# Patient Record
Sex: MALE | Race: BLACK OR AFRICAN AMERICAN | NOT HISPANIC OR LATINO | Employment: OTHER | URBAN - METROPOLITAN AREA
[De-identification: names, ages, dates, MRNs, and addresses within clinical notes are randomized per-mention and may not be internally consistent; named-entity substitution may affect disease eponyms.]

---

## 2017-06-14 ENCOUNTER — HOSPITAL ENCOUNTER (EMERGENCY)
Facility: HOSPITAL | Age: 62
Discharge: HOME | End: 2017-06-14
Attending: EMERGENCY MEDICINE
Payer: MEDICARE

## 2017-06-14 VITALS
OXYGEN SATURATION: 96 % | DIASTOLIC BLOOD PRESSURE: 79 MMHG | SYSTOLIC BLOOD PRESSURE: 139 MMHG | HEART RATE: 96 BPM | RESPIRATION RATE: 18 BRPM

## 2017-06-14 DIAGNOSIS — S46.312A TRICEPS STRAIN, LEFT, INITIAL ENCOUNTER: Primary | ICD-10-CM

## 2017-06-14 DIAGNOSIS — M79.602 ARM PAIN, POSTERIOR, LEFT: ICD-10-CM

## 2017-06-14 PROCEDURE — 99284 EMERGENCY DEPT VISIT MOD MDM: CPT

## 2017-06-14 PROCEDURE — 25000003 PHARM REV CODE 250: Performed by: EMERGENCY MEDICINE

## 2017-06-14 RX ORDER — ACETAMINOPHEN 325 MG/1
325 TABLET ORAL EVERY 6 HOURS PRN
Refills: 0 | COMMUNITY
Start: 2017-06-14 | End: 2017-06-19

## 2017-06-14 RX ORDER — HYDROCODONE BITARTRATE AND ACETAMINOPHEN 10; 325 MG/1; MG/1
1 TABLET ORAL
Status: COMPLETED | OUTPATIENT
Start: 2017-06-14 | End: 2017-06-14

## 2017-06-14 RX ORDER — TRAMADOL HYDROCHLORIDE 50 MG/1
50 TABLET ORAL EVERY 6 HOURS PRN
Qty: 12 TABLET | Refills: 0 | Status: SHIPPED | OUTPATIENT
Start: 2017-06-14 | End: 2017-06-24

## 2017-06-14 RX ADMIN — HYDROCODONE BITARTRATE AND ACETAMINOPHEN 1 TABLET: 10; 325 TABLET ORAL at 10:06

## 2017-06-15 ENCOUNTER — LAB VISIT (OUTPATIENT)
Dept: LAB | Facility: HOSPITAL | Age: 62
End: 2017-06-15
Attending: NURSE PRACTITIONER
Payer: MEDICARE

## 2017-06-15 ENCOUNTER — OFFICE VISIT (OUTPATIENT)
Dept: INTERNAL MEDICINE | Facility: CLINIC | Age: 62
End: 2017-06-15
Payer: MEDICARE

## 2017-06-15 VITALS
TEMPERATURE: 96 F | SYSTOLIC BLOOD PRESSURE: 140 MMHG | DIASTOLIC BLOOD PRESSURE: 88 MMHG | BODY MASS INDEX: 31.48 KG/M2 | HEIGHT: 65 IN | WEIGHT: 188.94 LBS | HEART RATE: 96 BPM

## 2017-06-15 DIAGNOSIS — E55.9 VITAMIN D DEFICIENCY: ICD-10-CM

## 2017-06-15 DIAGNOSIS — E78.5 HYPERLIPIDEMIA, UNSPECIFIED HYPERLIPIDEMIA TYPE: Chronic | ICD-10-CM

## 2017-06-15 DIAGNOSIS — F25.9 SCHIZOAFFECTIVE DISORDER, CHRONIC CONDITION: ICD-10-CM

## 2017-06-15 DIAGNOSIS — E11.49 TYPE 2 DIABETES MELLITUS WITH OTHER NEUROLOGIC COMPLICATION, UNSPECIFIED LONG TERM INSULIN USE STATUS: Chronic | ICD-10-CM

## 2017-06-15 DIAGNOSIS — F31.9 BIPOLAR 1 DISORDER: Chronic | ICD-10-CM

## 2017-06-15 DIAGNOSIS — Z72.0 TOBACCO ABUSE: Chronic | ICD-10-CM

## 2017-06-15 DIAGNOSIS — J45.20 MILD INTERMITTENT ASTHMA WITHOUT COMPLICATION: Chronic | ICD-10-CM

## 2017-06-15 DIAGNOSIS — R53.1 WEAKNESS: ICD-10-CM

## 2017-06-15 DIAGNOSIS — E11.49 TYPE 2 DIABETES MELLITUS WITH OTHER NEUROLOGIC COMPLICATION, UNSPECIFIED LONG TERM INSULIN USE STATUS: Primary | Chronic | ICD-10-CM

## 2017-06-15 DIAGNOSIS — G24.01 TARDIVE DYSKINESIA: ICD-10-CM

## 2017-06-15 DIAGNOSIS — R26.2 DIFFICULTY WALKING: ICD-10-CM

## 2017-06-15 LAB
25(OH)D3+25(OH)D2 SERPL-MCNC: 37 NG/ML
ALBUMIN SERPL BCP-MCNC: 4.3 G/DL
ALP SERPL-CCNC: 110 U/L
ALT SERPL W/O P-5'-P-CCNC: 18 U/L
ANION GAP SERPL CALC-SCNC: 13 MMOL/L
AST SERPL-CCNC: 14 U/L
BASOPHILS # BLD AUTO: 0.01 K/UL
BASOPHILS NFR BLD: 0.1 %
BILIRUB SERPL-MCNC: 0.4 MG/DL
BUN SERPL-MCNC: 16 MG/DL
CALCIUM SERPL-MCNC: 10.4 MG/DL
CHLORIDE SERPL-SCNC: 102 MMOL/L
CHOLEST/HDLC SERPL: 3.7 {RATIO}
CO2 SERPL-SCNC: 26 MMOL/L
CREAT SERPL-MCNC: 1.1 MG/DL
DIFFERENTIAL METHOD: ABNORMAL
EOSINOPHIL # BLD AUTO: 0.2 K/UL
EOSINOPHIL NFR BLD: 2.7 %
ERYTHROCYTE [DISTWIDTH] IN BLOOD BY AUTOMATED COUNT: 14.9 %
EST. GFR  (AFRICAN AMERICAN): >60 ML/MIN/1.73 M^2
EST. GFR  (NON AFRICAN AMERICAN): >60 ML/MIN/1.73 M^2
GLUCOSE SERPL-MCNC: 227 MG/DL
HCT VFR BLD AUTO: 47.7 %
HDL/CHOLESTEROL RATIO: 27.3 %
HDLC SERPL-MCNC: 132 MG/DL
HDLC SERPL-MCNC: 36 MG/DL
HGB BLD-MCNC: 16 G/DL
LDLC SERPL CALC-MCNC: 67.8 MG/DL
LYMPHOCYTES # BLD AUTO: 2.9 K/UL
LYMPHOCYTES NFR BLD: 39.9 %
MCH RBC QN AUTO: 31.6 PG
MCHC RBC AUTO-ENTMCNC: 33.5 %
MCV RBC AUTO: 94 FL
MONOCYTES # BLD AUTO: 0.5 K/UL
MONOCYTES NFR BLD: 6.6 %
NEUTROPHILS # BLD AUTO: 3.7 K/UL
NEUTROPHILS NFR BLD: 50.6 %
NONHDLC SERPL-MCNC: 96 MG/DL
PLATELET # BLD AUTO: 268 K/UL
PMV BLD AUTO: 9.9 FL
POTASSIUM SERPL-SCNC: 4.1 MMOL/L
PROT SERPL-MCNC: 8 G/DL
RBC # BLD AUTO: 5.06 M/UL
SODIUM SERPL-SCNC: 141 MMOL/L
TRIGL SERPL-MCNC: 141 MG/DL
WBC # BLD AUTO: 7.29 K/UL

## 2017-06-15 PROCEDURE — 99205 OFFICE O/P NEW HI 60 MIN: CPT | Mod: S$GLB,,, | Performed by: NURSE PRACTITIONER

## 2017-06-15 PROCEDURE — 85025 COMPLETE CBC W/AUTO DIFF WBC: CPT

## 2017-06-15 PROCEDURE — 80061 LIPID PANEL: CPT

## 2017-06-15 PROCEDURE — 82306 VITAMIN D 25 HYDROXY: CPT

## 2017-06-15 PROCEDURE — 80053 COMPREHEN METABOLIC PANEL: CPT

## 2017-06-15 PROCEDURE — 83036 HEMOGLOBIN GLYCOSYLATED A1C: CPT

## 2017-06-15 PROCEDURE — 99999 PR PBB SHADOW E&M-EST. PATIENT-LVL V: CPT | Mod: PBBFAC,,, | Performed by: NURSE PRACTITIONER

## 2017-06-15 PROCEDURE — 36415 COLL VENOUS BLD VENIPUNCTURE: CPT | Mod: PO

## 2017-06-15 PROCEDURE — 3046F HEMOGLOBIN A1C LEVEL >9.0%: CPT | Mod: S$GLB,,, | Performed by: NURSE PRACTITIONER

## 2017-06-15 RX ORDER — SIMVASTATIN 40 MG/1
40 TABLET, FILM COATED ORAL NIGHTLY
Qty: 30 TABLET | Refills: 0 | Status: SHIPPED | OUTPATIENT
Start: 2017-06-15 | End: 2017-08-08 | Stop reason: SDUPTHER

## 2017-06-15 RX ORDER — GABAPENTIN 600 MG/1
600 TABLET ORAL 2 TIMES DAILY
Qty: 60 TABLET | Refills: 0 | Status: SHIPPED | OUTPATIENT
Start: 2017-06-15 | End: 2017-08-08 | Stop reason: SDUPTHER

## 2017-06-15 RX ORDER — METFORMIN HYDROCHLORIDE 1000 MG/1
1000 TABLET ORAL 2 TIMES DAILY WITH MEALS
Qty: 60 TABLET | Refills: 0 | Status: SHIPPED | OUTPATIENT
Start: 2017-06-15 | End: 2017-08-08 | Stop reason: SDUPTHER

## 2017-06-15 RX ORDER — AMANTADINE HYDROCHLORIDE 100 MG/1
100 CAPSULE, GELATIN COATED ORAL 2 TIMES DAILY
Qty: 60 CAPSULE | Refills: 0 | Status: SHIPPED | OUTPATIENT
Start: 2017-06-15 | End: 2017-08-09 | Stop reason: SDUPTHER

## 2017-06-15 RX ORDER — INSULIN GLARGINE 100 [IU]/ML
18 INJECTION, SOLUTION SUBCUTANEOUS NIGHTLY
Qty: 18 ML | Refills: 4 | Status: SHIPPED | OUTPATIENT
Start: 2017-06-15 | End: 2018-01-19

## 2017-06-15 RX ORDER — OLANZAPINE 7.5 MG/1
7.5 TABLET ORAL NIGHTLY
Qty: 30 TABLET | Refills: 0 | Status: SHIPPED | OUTPATIENT
Start: 2017-06-15 | End: 2017-08-08 | Stop reason: SDUPTHER

## 2017-06-15 RX ORDER — DULOXETIN HYDROCHLORIDE 60 MG/1
60 CAPSULE, DELAYED RELEASE ORAL DAILY
Qty: 30 CAPSULE | Refills: 0 | Status: SHIPPED | OUTPATIENT
Start: 2017-06-15 | End: 2017-08-08 | Stop reason: SDUPTHER

## 2017-06-15 RX ORDER — ERGOCALCIFEROL 1.25 MG/1
50000 CAPSULE ORAL
Qty: 4 CAPSULE | Refills: 3 | Status: SHIPPED | OUTPATIENT
Start: 2017-06-15

## 2017-06-15 NOTE — DISCHARGE INSTRUCTIONS
Regarding muscle strains, I advised patient to: REST the injured area or use it less than usual; apply ICE to decrease swelling and pain and help prevent tissue damage; use COMPRESSION with an elastic bandage to support the area and decrease swelling; ELEVATE injured body part above the level of the heart to help decrease pain and swelling; AVOID using massage or massage to acute injuries as it may slow healing of the area; AVOID drinking alcohol as it may slow healing of the injury; and avoid stretching injured muscles. Advised patient to return to the emergency department or contact primary care provider if: having trouble moving injured area; notice tingling or numbness in or near the injured area; extremity below the bruise gets cold or turns pale; a new lump develops in the injured area; symptoms do not improve with treatment after 4 to 5 days; there is any questions or concerns about the condition or treatment plan.

## 2017-06-15 NOTE — ED PROVIDER NOTES
SCRIBE #1 NOTE: I, Marco Mcadams, am scribing for, and in the presence of, Preet Hernandes Jr., MD. I have scribed the entire note.      History      Chief Complaint   Patient presents with    Arm Pain     left       Review of patient's allergies indicates:   Allergen Reactions    Grape Anaphylaxis    Pcn [penicillins] Anaphylaxis    Saphris [asenapine] Other (See Comments)     TD/EPS        HPI   HPI    6/14/2017, 9:59 PM   History obtained from the patient      History of Present Illness: Ramu Jones is a 61 y.o. male patient with PMHx of DM and peripheral neuropathy who presents to the Emergency Department for L shoulder pain which onset gradually 2 days ago. Symptoms are intermittent and moderate in severity. Pain radiates down LUE. No mitigating or exacerbating factors reported. Associated sxs include paresthesias to LUE. Pt also reports he was recently dx with peripheral neuropathy of upper and lower extremities secondary to DM. Patient denies any fever, chills, CP, SOB, slurred speech, facial asymmetry, weakness, back pain, incontinence, and all other sxs at this time. No prior tx. No further complaints or concerns at this time.       Arrival mode: EMS    PCP: Rashel Doyle MD       Past Medical History:  Past Medical History:   Diagnosis Date    Asthma     Bipolar 1 disorder     Diabetes mellitus     Renal disorder     kidney stones    Schizophrenia        Past Surgical History:  Past Surgical History:   Procedure Laterality Date    HERNIA REPAIR      SHOULDER SURGERY Left          Family History:  Family History   Problem Relation Age of Onset    Schizophrenia Sister     Arthritis Sister     Asthma Sister     Depression Sister     Diabetes Sister     Hyperlipidemia Sister     Hypertension Sister     Mental illness Sister     Schizophrenia Brother     Asthma Brother     Diabetes Brother     Learning disabilities Brother     Mental illness Brother     Asthma Mother      Diabetes Mother     Mental illness Mother     Asthma Father     Mental illness Father        Social History:  Social History     Social History Main Topics    Smoking status: Current Every Day Smoker     Packs/day: 1.00     Years: 30.00     Start date: 1/1/1977    Smokeless tobacco: Never Used    Alcohol use No    Drug use: No    Sexual activity: Yes     Partners: Female       ROS   Review of Systems   Constitutional: Negative for fever.   HENT: Negative for sore throat.    Respiratory: Negative for shortness of breath.    Cardiovascular: Negative for chest pain.   Gastrointestinal: Negative for nausea.   Genitourinary: Negative for dysuria.   Musculoskeletal: Negative for back pain.        (+) LUE pain   Skin: Negative for rash.   Neurological: Negative for facial asymmetry, speech difficulty and weakness.        (+) paresthesias to LUE   Hematological: Does not bruise/bleed easily.   All other systems reviewed and are negative.    Physical Exam      Initial Vitals [06/14/17 2155]   BP Pulse Resp Temp SpO2   (!) 140/94 100 16 -- 96 %      Physical Exam  Nursing Notes and Vital Signs Reviewed.  Constitutional: Patient is in no acute distress. Well-developed and well-nourished.   Head: Atraumatic. Normocephalic.  Eyes: PERRL. EOM intact. Conjunctivae are not pale. No scleral icterus.  ENT: Mucous membranes are moist. Oropharynx is clear and symmetric.    Neck: Supple. Full ROM. No lymphadenopathy.  Cardiovascular: Regular rate. Regular rhythm. No murmurs, rubs, or gallops. Distal pulses are 2+ and symmetric.  Pulmonary/Chest: No respiratory distress. Clear to auscultation bilaterally. No wheezing, rales, or rhonchi.  Abdominal: Soft and non-distended.  There is no tenderness.  No rebound, guarding, or rigidity. Good bowel sounds.  Genitourinary: No CVA tenderness  Musculoskeletal: Moves all extremities. No obvious deformities. No edema. No calf tenderness.  LUE: has no evident deformity. TTP over L trapezius  muscle. Negative for swelling. ROM is normal. FROM of elbow. Limited ROM of shoulder secondary to pain. Cap refill distally is <2 seconds. Radial pulses are equal and 2+ bilaterally. No motor deficit. No distal sensory deficit.  Skin: Warm and dry.  Neurological:  Alert, awake, and appropriate.  Normal speech.  No acute focal neurological deficits are appreciated.  Psychiatric: Normal affect. Good eye contact. Appropriate in content.    ED Course    Procedures  ED Vital Signs:  Vitals:    06/14/17 2155 06/14/17 2331   BP: (!) 140/94 139/79   Pulse: 100 96   Resp: 16 18   SpO2: 96%          Imaging Results:  Imaging Results          X-Ray Humerus 2 View Left (Final result)  Result time 06/14/17 23:08:25    Final result by Blair Thorpe MD (06/14/17 23:08:25)                 Impression:     See above      Electronically signed by: BLAIR THORPE MD  Date:     06/14/17  Time:    23:08              Narrative:    Exam:Left humerus 2 views    History:     Left arm pain.    Findings:     No fracture or dislocation.  Postop changes left shoulder.                                      The Emergency Provider reviewed the vital signs and test results, which are outlined above.    ED Discussion     11:15 PM: Reassessed pt at this time. Pt is AAO x3, in NAD, and VSS. Pt states his condition has improved at this time. Discussed with pt all pertinent ED information and results. Discussed pt dx and plan of tx. Gave pt all f/u and return to the ED instructions. All questions and concerns were addressed at this time. Pt expresses understanding of information and instructions, and is comfortable with plan to discharge. Pt is stable for discharge.    Driving or other activities under influence of medications - Patient and/or family/caretaker was given a prescription for, or instructed to use a medicine that may impair ability to drive, operate machinery, or participate in other potentially dangerous activities.  Patient was instructed not  to participate in these activities while under the influence of these medications.    Regarding STRAINS/CONTUSIONS, I advised patient to: REST the injured area or use it less than usual; apply ICE to decrease swelling and pain and help prevent tissue damage; use COMPRESSION with an elastic bandage to support the area and decrease swelling; ELEVATE injured body part above the level of the heart to help decrease pain and swelling; AVOID using massage or massage to acute injuries as it may slow healing of the area; AVOID drinking alcohol as it may slow healing of the injury; and avoid stretching injured muscles. Advised patient to return to the emergency department or contact primary care provider if: having trouble moving injured area; notice tingling or numbness in or near the injured area; extremity below the bruise gets cold or turns pale; a new lump develops in the injured area; symptoms do not improve with treatment after 4 to 5 days; there is any questions or concerns about the condition or treatment plan.     ED Medication(s):  Medications   hydrocodone-acetaminophen 10-325mg per tablet 1 tablet (1 tablet Oral Given 6/14/17 0843)       Discharge Medication List as of 6/14/2017 11:16 PM      START taking these medications    Details   acetaminophen (TYLENOL) 325 MG tablet Take 1 tablet (325 mg total) by mouth every 6 (six) hours as needed for Pain., Starting Wed 6/14/2017, OTC      tramadol (ULTRAM) 50 mg tablet Take 1 tablet (50 mg total) by mouth every 6 (six) hours as needed for Pain (use if tylenol does not adequately relieve pain)., Starting Wed 6/14/2017, Until Sat 6/24/2017, Print             Follow-up Information     Rashel Doyle MD. Schedule an appointment as soon as possible for a visit in 1 week.    Specialty:  Internal Medicine  Contact information:  [address] [facility]  [address]  [facility] [id number] [phone]             [facility]. Schedule an appointment as soon as possible  for a visit in 1 week.    Why:  or your physician  Contact information:  [address] [facility] [address]  [phone]             [facility] Internal Medicine. Schedule an appointment as soon as possible for a visit in 1 week.    Specialty:  Internal Medicine  Why:  or your physician  Contact information:  [address] [facility] [facility] [address]  [facility] [id number] [phone]  Additional information:  (off [facility]) 1st floor           [facility] - .    Specialty:  Emergency Medicine  Why:  As needed, If symptoms worsen  Contact information:  [address] [facility] [address] [phone]                   Medical Decision Making    Medical Decision Making:   Clinical Tests:   Radiological Study: Ordered and Reviewed           Scribe Attestation:   Scribe #1: I performed the above scribed service and the documentation accurately describes the services I performed. I attest to the accuracy of the note.    Attending:   Physician Attestation Statement for Scribe #1: I, Preet Hernandes Jr., MD, personally performed the services described in this documentation, as scribed by Marco Mcadams, in my presence, and it is both accurate and complete.          Clinical Impression       ICD-10-CM ICD-9-CM   1. Triceps strain, left, initial encounter S46.312A 840.8   2. Arm pain, posterior, left M79.602 729.5       Disposition:   Disposition: Discharged  Condition: Stable         Preet Hernandes Jr., MD  06/16/17 0322

## 2017-06-15 NOTE — PROGRESS NOTES
Subjective:       Patient ID: Ramu Jones is a 61 y.o. male.    Chief Complaint: Extremity Weakness    Pt presents with numbness and tingling to bilateral hands and leg weakness and need to get established with multiple specialists. Pt was living in [place], but he is now living in [place] and would like to get established with all specialists here in [place].       Smokes 1/2 pack of cigarettes a day  No illicit drug use  No ETOH abuse    DM-   Pt reports polyuria, polyphagia, and polydipsia- he reports urinating every 30 minutes  His BG is not well controlled. He is experiencing numbness and tingling to all extremities  Pt reports that he saw an eye doctor last year- he reports that he did not get his glasses because he could not afford them  He states that he tries to be compliant with DM diet- his wife cooks for him  He reports that he was getting Metformin and both insulins from a mail in company- he is not sure which one.    Bipolar/Schizophrenia- pt thinks that he was last seen by psych 2 years ago (he was seeing Iraj Mac NP [facility]). His PCP in [place] (external to [facility]) was filling his psych meds for him. He was dxed with tardive dyskinesia and has been on Symmetrel.     Vit D def- pt has been out of Vitamin D supplements for a while. He is not sure how long.           Review of Systems   Constitutional: Negative for chills, fatigue and fever.   HENT: Negative for congestion, ear pain, postnasal drip, rhinorrhea, sinus pressure, sneezing and sore throat.    Eyes: Positive for visual disturbance (pt was not able to afford prescribed glasses ). Negative for photophobia and pain.   Respiratory: Negative for cough, chest tightness and shortness of breath.    Cardiovascular: Negative for chest pain, palpitations and leg swelling.   Gastrointestinal: Negative for abdominal pain, constipation, diarrhea, nausea and vomiting.   Endocrine: Positive for polydipsia, polyphagia and polyuria.    Genitourinary: Negative for dysuria and frequency.   Musculoskeletal: Positive for gait problem. Negative for arthralgias.   Skin: Negative.    Allergic/Immunologic: Negative.    Neurological: Negative for dizziness, light-headedness and headaches.   Hematological: Negative.    Psychiatric/Behavioral: Negative.  Negative for sleep disturbance.       Objective:      Physical Exam   Constitutional: He is oriented to person, place, and time. He appears well-developed.   HENT:   Head: Normocephalic and atraumatic.   Eyes: Conjunctivae and EOM are normal.   Neck: Normal range of motion.   Cardiovascular: Normal rate, regular rhythm and normal heart sounds.    Pulmonary/Chest: Effort normal and breath sounds normal.   Abdominal: He exhibits distension.   Musculoskeletal:   Walks with a cane. Gait is unsteady   Neurological: He is alert and oriented to person, place, and time.   Skin: Skin is warm and dry.   Psychiatric: He has a normal mood and affect.       Assessment:       1. Type 2 diabetes mellitus with other neurologic complication, unspecified long term insulin use status    2. Difficulty walking    3. Weakness    4. Hyperlipidemia, unspecified hyperlipidemia type    5. Bipolar 1 disorder    6. Schizoaffective disorder, chronic condition    7. Tardive dyskinesia    8. Mild intermittent asthma without complication    9. Vitamin D deficiency    10. Tobacco abuse        Plan:   Type 2 diabetes mellitus with other neurologic complication, unspecified long term insulin use status  -     Ambulatory Referral to Diabetes Education  -     Ambulatory consult to Endocrinology  -     Ambulatory referral to Podiatry  -     CBC auto differential; Future; Expected date: 06/15/2017  -     Comprehensive metabolic panel; Future; Expected date: 06/15/2017  -     HEMOGLOBIN A1C; Future; Expected date: 06/15/2017  -     Microalbumin/creatinine urine ratio; Future; Expected date: 06/15/2017  -     Urinalysis; Future; Expected date:  06/15/2017  -     Lipid panel; Future; Expected date: 06/15/2017  -     gabapentin (NEURONTIN) 600 MG tablet; Take 1 tablet (600 mg total) by mouth 2 (two) times daily.  Dispense: 60 tablet; Refill: 0  -     metformin (GLUCOPHAGE) 1000 MG tablet; Take 1 tablet (1,000 mg total) by mouth 2 (two) times daily with meals.  Dispense: 60 tablet; Refill: 0  -     liraglutide 0.6 mg/0.1 mL, 18 mg/3 mL, subq PNIJ 0.6 mg/0.1 mL (18 mg/3 mL) PnIj; Inject 1.2 mg into the skin once daily.  Dispense: 18 mL; Refill: 3  -     insulin glargine (LANTUS SOLOSTAR) 100 unit/mL (3 mL) InPn pen; Inject 18 Units into the skin every evening.  Dispense: 18 mL; Refill: 4  -     Ambulatory consult to Optometry    Difficulty walking    Weakness    Hyperlipidemia, unspecified hyperlipidemia type  -     Lipid panel; Future; Expected date: 06/15/2017  -     simvastatin (ZOCOR) 40 MG tablet; Take 1 tablet (40 mg total) by mouth every evening.  Dispense: 30 tablet; Refill: 0    Bipolar 1 disorder  -     Ambulatory consult to Psychiatry    Schizoaffective disorder, chronic condition  -     olanzapine (ZYPREXA) 7.5 MG tablet; Take 1 tablet (7.5 mg total) by mouth every evening.  Dispense: 30 tablet; Refill: 0  -     duloxetine (CYMBALTA) 60 MG capsule; Take 1 capsule (60 mg total) by mouth once daily.  Dispense: 30 capsule; Refill: 0    Tardive dyskinesia  -     amantadine HCl (SYMMETREL) 100 mg capsule; Take 1 capsule (100 mg total) by mouth 2 (two) times daily.  Dispense: 60 capsule; Refill: 0    Mild intermittent asthma without complication    Vitamin D deficiency  -     ergocalciferol (ERGOCALCIFEROL) 50,000 unit Cap; Take 1 capsule (50,000 Units total) by mouth every 7 days.  Dispense: 4 capsule; Refill: 3  -     Vitamin D; Future; Expected date: 12/12/2017    Tobacco abuse      As above  To establish with Dr. Farias next week  Multiple specialists referrals as above  Specialists to address further medication refills- one month supply given.   An  excess of 40 mins was spent arranging care for this patient

## 2017-06-16 LAB
ESTIMATED AVG GLUCOSE: 163 MG/DL
HBA1C MFR BLD HPLC: 7.3 %

## 2017-06-19 ENCOUNTER — OFFICE VISIT (OUTPATIENT)
Dept: INTERNAL MEDICINE | Facility: CLINIC | Age: 62
End: 2017-06-19
Payer: MEDICARE

## 2017-06-19 VITALS
DIASTOLIC BLOOD PRESSURE: 80 MMHG | HEART RATE: 99 BPM | WEIGHT: 190.25 LBS | SYSTOLIC BLOOD PRESSURE: 104 MMHG | HEIGHT: 65 IN | BODY MASS INDEX: 31.7 KG/M2 | TEMPERATURE: 97 F

## 2017-06-19 DIAGNOSIS — R25.1 TREMORS OF NERVOUS SYSTEM: ICD-10-CM

## 2017-06-19 DIAGNOSIS — Z79.4 TYPE 2 DIABETES MELLITUS WITH OTHER NEUROLOGIC COMPLICATION, WITH LONG-TERM CURRENT USE OF INSULIN: ICD-10-CM

## 2017-06-19 DIAGNOSIS — F20.9 SCHIZOPHRENIA, UNSPECIFIED TYPE: Primary | ICD-10-CM

## 2017-06-19 DIAGNOSIS — E11.49 TYPE 2 DIABETES MELLITUS WITH OTHER NEUROLOGIC COMPLICATION, WITH LONG-TERM CURRENT USE OF INSULIN: ICD-10-CM

## 2017-06-19 DIAGNOSIS — F31.9 BIPOLAR 1 DISORDER: Chronic | ICD-10-CM

## 2017-06-19 DIAGNOSIS — R53.1 WEAKNESS: ICD-10-CM

## 2017-06-19 DIAGNOSIS — G93.0 ARACHNOID CYST: ICD-10-CM

## 2017-06-19 PROCEDURE — 99999 PR PBB SHADOW E&M-EST. PATIENT-LVL IV: CPT | Mod: PBBFAC,,, | Performed by: FAMILY MEDICINE

## 2017-06-19 PROCEDURE — 3045F PR MOST RECENT HEMOGLOBIN A1C LEVEL 7.0-9.0%: CPT | Mod: S$GLB,,, | Performed by: FAMILY MEDICINE

## 2017-06-19 PROCEDURE — 99214 OFFICE O/P EST MOD 30 MIN: CPT | Mod: S$GLB,,, | Performed by: FAMILY MEDICINE

## 2017-06-19 NOTE — PROGRESS NOTES
Subjective:       Patient ID: Ramu Jones is a 61 y.o. male.    Chief Complaint: Establish Care    DM:       Pt is a Diabetic Type 2 who has an A1C of about 7.3. Pt is on lantus and victoza with metformin. Pt has colonoscopy but was done at [facility]. Pt has a diagnosis of schizophrenia. Pt does see psychiatry.       Tremors:       Unclear if this is neuro based or psychiatric based. Pt does have a archnoid cyst found on MRI. Pt reports that his legs will start tremor. His lower back xray is normal. He denied any tremors in the upper extremity. Pt reports some weakness and falls. Review of chart shows a MRI done which revealed an arachnoid cyst.      Review of Systems   Constitutional: Negative for activity change.   HENT: Negative.    Respiratory: Negative.    Cardiovascular: Negative.    Gastrointestinal: Negative.    Genitourinary: Negative.    Musculoskeletal: Positive for arthralgias.   Neurological: Positive for tremors and weakness. Negative for dizziness, seizures and numbness.   Psychiatric/Behavioral: Negative.        Objective:      Physical Exam   Constitutional: He is oriented to person, place, and time. He appears well-developed and well-nourished.   Neck: Normal range of motion. Neck supple.   Cardiovascular: Normal rate and regular rhythm.    No murmur heard.  Pulmonary/Chest: Effort normal and breath sounds normal. He has no wheezes.   Abdominal: Soft. Bowel sounds are normal.   Neurological: He is alert and oriented to person, place, and time. He has normal reflexes. No cranial nerve deficit.   Skin: Skin is warm and dry.   Psychiatric: His behavior is normal. Judgment normal. His affect is blunt. He is not actively hallucinating. He expresses no homicidal and no suicidal ideation. He expresses no suicidal plans and no homicidal plans.       Assessment:       1. Schizophrenia, unspecified type    2. Bipolar 1 disorder    3. Weakness    4. Tremors of nervous system    5. Type 2 diabetes  mellitus with other neurologic complication, with long-term current use of insulin    6. Arachnoid cyst        Plan:       Schizophrenia, unspecified type  Comments:  Pt will continue to see Psychiatry    Bipolar 1 disorder  Comments:  Pt will continue to see Psychiatry    Weakness  Comments:  Pt is complaining of general weakness and increase falls. would benifit from physical therapy.  Orders:  -     Ambulatory referral to Neurology  -     Ambulatory consult to Physical Therapy    Tremors of nervous system  Comments:  Unclear etiology or if this is even psychiatric based but does have old MRI with arachnoid cyst finding. consult to neurology  Orders:  -     Ambulatory referral to Neurology  -     Ambulatory consult to Physical Therapy    Type 2 diabetes mellitus with other neurologic complication, with long-term current use of insulin  Comments:  Recommend go up 5 units on lantus at last A1C was 7.3 but discussed better diet    Arachnoid cyst  Comments:  Last MRI was done about 2 years ago will refer to Neurology

## 2017-06-21 ENCOUNTER — OFFICE VISIT (OUTPATIENT)
Dept: ENDOCRINOLOGY | Facility: CLINIC | Age: 62
End: 2017-06-21
Payer: MEDICARE

## 2017-06-21 VITALS
WEIGHT: 190.25 LBS | DIASTOLIC BLOOD PRESSURE: 82 MMHG | HEIGHT: 65 IN | TEMPERATURE: 97 F | SYSTOLIC BLOOD PRESSURE: 132 MMHG | HEART RATE: 72 BPM | BODY MASS INDEX: 31.7 KG/M2

## 2017-06-21 PROCEDURE — 3045F PR MOST RECENT HEMOGLOBIN A1C LEVEL 7.0-9.0%: CPT | Mod: S$GLB,,, | Performed by: INTERNAL MEDICINE

## 2017-06-21 PROCEDURE — 99204 OFFICE O/P NEW MOD 45 MIN: CPT | Mod: S$GLB,,, | Performed by: INTERNAL MEDICINE

## 2017-06-21 PROCEDURE — 99999 PR PBB SHADOW E&M-EST. PATIENT-LVL III: CPT | Mod: PBBFAC,,, | Performed by: INTERNAL MEDICINE

## 2017-06-21 NOTE — PROGRESS NOTES
Subjective:       Patient ID: Ramu Jones is a 61 y.o. male.    Patient is new to me    Records were reviewed  Chief Complaint: Diabetes Mellitus    HPI   Ramu Jones is here for initial evaluation of type 2 Diabetes Mellitus    Consultation requested by Nona Garcia    PCP: Rashel Doyle MD      Diagnosed: 2008      Lab Results   Component Value Date    HGBA1C 7.3 (H) 06/15/2017    HGBA1C 9.4 10/31/2016    HGBA1C 7.7 (H) 02/20/2015       Diabetes medications include: Lantus 22 units up from 18 units since visit with PCP few days ago; a;so on  Metformin and victoza(started oct 2016)    Diabetes Complications:peripheral neuropathy    Hypoglycemia: NO      Meal Planning: tries to eat healthy    Diabetes education: YES: in past       SMBG: Tests BG 2 times a day    Log or meter available: no    Home values if available:  FBG:  Pre-lunch:  Pre-dinner:  Bedtime:  Post Breakfast:  Post Lunch  Post Dinner  Ranges:    Exercise: No     STANDARDS of CARE:        ACE inhibitor or angiotensin II receptor blocker:   No        Statin drug:  Yes        Eye exam within last year: Yes        Influenza vaccine up to date: Yes        Pneumonia vaccine: Yes -               Review of Systems   Constitutional: Negative for appetite change, diaphoresis, fatigue, fever and unexpected weight change.   HENT: Negative for sore throat and trouble swallowing.    Eyes: Negative for visual disturbance.   Respiratory: Negative for shortness of breath and wheezing.    Cardiovascular: Negative for chest pain, palpitations and leg swelling.   Gastrointestinal: Positive for abdominal pain. Negative for diarrhea, nausea and vomiting.   Endocrine: Positive for polyuria. Negative for cold intolerance, heat intolerance, polydipsia and polyphagia.   Genitourinary: Negative for difficulty urinating and dysuria.   Musculoskeletal: Negative for arthralgias and joint swelling.   Skin: Negative for color change and rash.   Neurological:  Positive for numbness. Negative for dizziness, tremors and headaches.        Hands and feet numb months to years   Hematological: Negative for adenopathy.   Psychiatric/Behavioral: Negative for confusion, dysphoric mood and sleep disturbance. The patient is not nervous/anxious.        Objective:      Physical Exam   Constitutional: He is oriented to person, place, and time. He appears well-developed and well-nourished. No distress.   HENT:   Head: Normocephalic and atraumatic.   Mouth/Throat: No oropharyngeal exudate.   Eyes: Conjunctivae and EOM are normal. Pupils are equal, round, and reactive to light. No scleral icterus.   Neck: No tracheal deviation present. No thyromegaly present.   Cardiovascular: Normal rate, regular rhythm, normal heart sounds and intact distal pulses.  Exam reveals no gallop and no friction rub.    No murmur heard.  Pulmonary/Chest: Effort normal and breath sounds normal. No respiratory distress. He has no wheezes. He has no rales.   Abdominal: Soft. Bowel sounds are normal. He exhibits no distension and no mass. There is no tenderness. There is no rebound and no guarding. No hernia.   Musculoskeletal: He exhibits no edema or deformity.   Lymphadenopathy:     He has no cervical adenopathy.   Neurological: He is alert and oriented to person, place, and time. He has normal reflexes. No cranial nerve deficit.   Skin: Skin is warm and dry. No rash noted. He is not diaphoretic. No erythema.   Psychiatric: He has a normal mood and affect. His behavior is normal.   Vitals reviewed.    Protective Sensation (w/ 10 gram monofilament):  Right: Intact  Left: Intact    Visual Inspection:  Callus -  Bilateral    Pedal Pulses:   Right: Present  Left: Present    Posterior tibialis:   Right:Present  Left: Present        Lab Review:     No components found for: AGBA1C  Lab Results   Component Value Date    CHOL 132 06/15/2017    TRIG 141 06/15/2017    HDL 36 (L) 06/15/2017    CHOLHDL 27.3 06/15/2017     TOTALCHOLEST 3.7 06/15/2017    NONHDLCHOL 96 06/15/2017     BMP  Lab Results   Component Value Date     06/15/2017    K 4.1 06/15/2017     06/15/2017    CO2 26 06/15/2017    BUN 16 06/15/2017    CREATININE 1.1 06/15/2017    CALCIUM 10.4 06/15/2017    ANIONGAP 13 06/15/2017    ESTGFRAFRICA >60.0 06/15/2017    EGFRNONAA >60.0 06/15/2017     Lab Results   Component Value Date    WBC 7.29 06/15/2017    HGB 16.0 06/15/2017    HCT 47.7 06/15/2017    MCV 94 06/15/2017     06/15/2017     Lab Results   Component Value Date    CREATRANDUR 105.0 06/15/2017    MICALBCREAT 19.0 06/15/2017           Assessment:     1. Type 2 diabetes, uncontrolled, with neuropathy          Discussed the pathophysiology, complications, and management of diabetes and the importance of adhering to treatment goals and plans  Plan:   Type 2 diabetes, uncontrolled, with neuropathy        1.  Rx changes: none  2.  Education: Reviewed ABCs of diabetes management (respective goals in parentheses):  A1C (<7), blood pressure (<130/80), and cholesterol (LDL <100).  3.  Compliance at present is estimated to be adequate. Efforts to improve compliance (if necessary) will be directed at dietary modifications: watch portions and regular blood sugar monitorin to 3  times daily.    Return in about 3 months (around 2017).  This note is unavailable for viewing online. Certain specialties, including Behavioral Health and Pain Management, are currently not included in the open progress note program. For notes unavailable online, you can request a copy of your medical record.         Thank you to Nona Garcia for this consultation

## 2017-06-22 ENCOUNTER — CLINICAL SUPPORT (OUTPATIENT)
Dept: DIABETES | Facility: CLINIC | Age: 62
End: 2017-06-22
Payer: MEDICARE

## 2017-06-22 DIAGNOSIS — E11.49 DIABETIC NEUROPATHY WITH NEUROLOGIC COMPLICATION: Primary | ICD-10-CM

## 2017-06-22 DIAGNOSIS — E11.49 TYPE 2 DIABETES MELLITUS WITH OTHER NEUROLOGIC COMPLICATION, WITH LONG-TERM CURRENT USE OF INSULIN: Primary | ICD-10-CM

## 2017-06-22 DIAGNOSIS — Z79.4 TYPE 2 DIABETES MELLITUS WITH OTHER NEUROLOGIC COMPLICATION, WITH LONG-TERM CURRENT USE OF INSULIN: Primary | ICD-10-CM

## 2017-06-22 DIAGNOSIS — E11.40 DIABETIC NEUROPATHY WITH NEUROLOGIC COMPLICATION: Primary | ICD-10-CM

## 2017-06-22 PROCEDURE — G0109 DIAB MANAGE TRN IND/GROUP: HCPCS | Mod: S$GLB,,, | Performed by: DIETITIAN, REGISTERED

## 2017-06-22 PROCEDURE — 99999 PR PBB SHADOW E&M-EST. PATIENT-LVL II: CPT | Mod: PBBFAC,,,

## 2017-06-23 NOTE — PROGRESS NOTES
Diabetes Education  Author: Kalina Padilla RD, CDE  Date: 6/23/2017    Diabetes Education Visit  Diabetes Education Record Assessment/Progress: Comprehensive/Group    Diabetes Type  Diabetes Type : Type II    Diabetes History  Diabetes Diagnosis: 5-10 years    Nutrition  Meal Planning: snacks between meal, 3 meals per day, water, artificial sweeteners, eats out seldom    Monitoring   Self Monitoring : daily monitoring reported.  Blood Glucose Logs: No    Exercise   Frequency: Never    Current Diabetes Treatment   Current Treatment: Diet, Injectable, Insulin, Oral Medication    Social History  Primary Support: Spouse  Educational Level: High School  Smoking Status: Current Smoker  Alcohol Use: Never                             Barriers to Change  Barriers to Change: None  Learning Challenges : Literacy    Readiness to Learn   Readiness to Learn : Acceptance    Cultural Influences  Cultural Influences: No    Diabetes Education Assessment/Progress  Acute Complications (preventing, detecting, and treating acute complications): Class, Written Materials Provided, Competent Family/SO  Chronic Complications (preventing, detecting, and treating chronic complications): Class, Written Materials Provided, Competent Family/SO  Diabetes Disease Process (diabetes disease process and treatment options): Class, Written Materials Provided, Competent Family/SO  Nutrition (Incorporating nutritional management into one's lifestyle): Class, Written Materials Provided, Competent Family/SO  Physical Activity (incorporating physical activity into one's lifestyle): Class, Written Materials Provided, Competent Family/SO  Medications (states correct name, dose, onset, peak, duration, side effects & timing of meds): Class, Written Materials Provided, Competent Family/SO  Monitoring (monitoring blood glucose/other parameters & using results): Class, Written Materials Provided, Competent Family/SO  Goal Setting and Problem Solving (verbalizes  behavior change strategies & sets realistic goals): Class, Written Materials Provided, Competent Family/SO  Behavior Change (developing personal strategies to health & behavior change): Class, Written Materials Provided, Requires Assistance, Competent Family/SO  Psychosocial Issues (developing personal srategies to address psychosocial concerns): Class, Written Materials Provided, Competent Family/SO    Goals  Monitoring: Set (I will monitor my BG QID.)  Start Date: 06/23/17  Target Date: 09/23/17         Diabetes Care Plan/Intervention  Education Plan/Intervention: Individual Follow-Up DSMT, Eye Exam    Diabetes Meal Plan  Restrictions: Low Fat, Low Sodium  Calories: 1600  Carbohydrate Per Meal: 45-60g  Carbohydrate Per Snack : 15-30g    Education Units of Time   Time Spent: 120 min      Health Maintenance Topics with due status: Not Due       Topic Last Completion Date    Lipid Panel 06/15/2017    Hemoglobin A1c 06/15/2017    Urine Microalbumin 06/15/2017    Foot Exam 06/19/2017    Influenza Vaccine Not Due     Health Maintenance Due   Topic Date Due    Hepatitis C Screening  1955    Eye Exam  07/22/1966    TETANUS VACCINE  07/22/1974    Pneumococcal PPSV23 (Medium Risk) (1) 07/22/1974    Colonoscopy  07/22/2006    Zoster Vaccine  07/22/2016

## 2017-06-29 ENCOUNTER — OFFICE VISIT (OUTPATIENT)
Dept: PODIATRY | Facility: CLINIC | Age: 62
End: 2017-06-29
Payer: MEDICARE

## 2017-06-29 VITALS
BODY MASS INDEX: 31.77 KG/M2 | HEART RATE: 87 BPM | WEIGHT: 190.69 LBS | SYSTOLIC BLOOD PRESSURE: 123 MMHG | HEIGHT: 65 IN | DIASTOLIC BLOOD PRESSURE: 86 MMHG

## 2017-06-29 DIAGNOSIS — E11.9 COMPREHENSIVE DIABETIC FOOT EXAMINATION, TYPE 2 DM, ENCOUNTER FOR: Primary | ICD-10-CM

## 2017-06-29 DIAGNOSIS — B35.3 TINEA PEDIS OF BOTH FEET: ICD-10-CM

## 2017-06-29 PROCEDURE — 3045F PR MOST RECENT HEMOGLOBIN A1C LEVEL 7.0-9.0%: CPT | Mod: S$GLB,,, | Performed by: PODIATRIST

## 2017-06-29 PROCEDURE — 99214 OFFICE O/P EST MOD 30 MIN: CPT | Mod: S$GLB,,, | Performed by: PODIATRIST

## 2017-06-29 PROCEDURE — 99999 PR PBB SHADOW E&M-EST. PATIENT-LVL III: CPT | Mod: PBBFAC,,, | Performed by: PODIATRIST

## 2017-06-29 RX ORDER — CLOTRIMAZOLE AND BETAMETHASONE DIPROPIONATE 10; .64 MG/G; MG/G
CREAM TOPICAL 2 TIMES DAILY
Qty: 45 G | Refills: 2 | Status: SHIPPED | OUTPATIENT
Start: 2017-06-29

## 2017-06-29 NOTE — LETTER
June 29, 2017      Nona Garcia, RAYNA  [address] [place] [address]           [facility]  [address]  [place] [address]  Phone: [phone]  Fax: [phone]          Patient: Ramu Jones   MR Number: [id number]   YOB: 1955   Date of Visit: 6/29/2017       Dear Nona Garcia:    Thank you for referring Ramu Jones to me for evaluation. Attached you will find relevant portions of my assessment and plan of care.    If you have questions, please do not hesitate to call me. I look forward to following Ramu Jones along with you.    Sincerely,    Elo Esparza DPM    Enclosure  CC:  No Recipients    If you would like to receive this communication electronically, please contact [email or web address] or [phone] to request more information on [employer] Link access.    For providers and/or their staff who would like to refer a patient to [facility], please contact us through our one-stop-shop provider referral line, [facility], at [phone].    If you feel you have received this communication in error or would no longer like to receive these types of communications, please e-mail [email or web address]

## 2017-06-29 NOTE — PATIENT INSTRUCTIONS
[facility]    [address] [place] [address]  Phone: [phone] [address] [place] [address]   Phone: [phone] [address] [place] [address]   Phone: [phone] [address] [facility] [place] [address]   Phone: [phone]    [address] [place] [address]   Phone: [phone] [address] [facility] [address] [place] [address], [place] [address]   Phone: [phone]

## 2017-07-03 ENCOUNTER — TELEPHONE (OUTPATIENT)
Dept: INTERNAL MEDICINE | Facility: CLINIC | Age: 62
End: 2017-07-03

## 2017-07-03 NOTE — TELEPHONE ENCOUNTER
Pt's wife in lobby asking for refill for True Metrix test strips to be sent to [facility]. Per verbal order of Dr. Farias, called in rx for True Metrix test strips, dispense 100 with 2 refills, spoke with Rafael.

## 2017-07-11 ENCOUNTER — CLINICAL SUPPORT (OUTPATIENT)
Dept: REHABILITATION | Facility: HOSPITAL | Age: 62
End: 2017-07-11
Attending: FAMILY MEDICINE
Payer: MEDICARE

## 2017-07-11 DIAGNOSIS — R26.2 DIFFICULTY WALKING: Primary | ICD-10-CM

## 2017-07-11 DIAGNOSIS — R53.1 WEAKNESS: ICD-10-CM

## 2017-07-11 PROCEDURE — G8979 MOBILITY GOAL STATUS: HCPCS | Mod: CJ

## 2017-07-11 PROCEDURE — G8978 MOBILITY CURRENT STATUS: HCPCS | Mod: CK

## 2017-07-11 PROCEDURE — 97110 THERAPEUTIC EXERCISES: CPT

## 2017-07-11 PROCEDURE — 97161 PT EVAL LOW COMPLEX 20 MIN: CPT

## 2017-07-11 NOTE — PROGRESS NOTES
PHYSICAL THERAPY INITIAL OUTPATIENT EVALUATION    Referring Provider:  Dr. Tommie Farias    Diagnosis:       ICD-10-CM ICD-9-CM    1. Difficulty walking R26.2 719.7    2. Weakness R53.1 780.79      Orders:  Evaluate and Treat    Date of Initial Evaluation: 7/11/17      Visit # 1 of 12    BACKGROUND: Patient is a 60 y/o male with difficulty walking and a history of falls.  He states that he began having trouble walking about 2 years ago following neck surgery.  He has also been diagnosed with an arachnoid cyst in his brain, but the patient's family appears unaware of that diagnosis. He states that he primarily uses a cane for ambulation but he has a RW for longer distance use.  He states that he has mild pain in his legs.    OBJECTIVE    Gait:  Patient ambulates with slow pace, wide RAFAELA, and decreased step length using str cane.  Patient able to ambulate with increased speed and improved balance using rollator.      Strength:  Strength/Myotome  Right Left   L2 Hip Flexor 3-/5 3-/5   L3 Quad 3-/5 3-/5   L4 Hamstring 3-/5 3-/5   L5 ANT TIB 4/5 4/5     Neuro/Sensation:   Reflexes and Sensation impaired    Special Test:  Moderate HS tightness bilaterally;  TUG - 18 seconds with rollator    Function: Patient reports 49% disability based on score of the Optimal    Functional Limitations and Goal:   This patient's primary Physical Therapy goal is to improve his current level of function(Walking and moving around ) with minimal limitations. The patient's current level of impairment is 40-60% Impaired(CK) based on their score of 49% impaired on the Optimal. The Patient is expected to achieve a score of 20-39%(CJ) within 10 treatments.    ASSESSMENT:  The patient is a 61 y.o. year old male who presents to physical therapy with decreased balance and frequent falls.  PMhx includes cervical surgery and an arachnoid cyst.  The patient's impairments include decreased LE strength and coordination which appear neurological in  nature.  He may benefit from further neurological testing and brain scans to check the arachnoid cyst to determine if it is the cause of his symptoms.  Patient will benefit from skilled physical therapy intervention to improve his LE strength and coordination and decrease his fall risk.    Short Term Goals:    1.I with HEP  2. Pt to increase BLE strength to 3+/5 or greater throughout.   3. Pt to ambulate with AD for 250' with SBA.  4. Pt to score less than 40% impaired on the mobility portion of the Optimal.  Long Term Goals:  1.Pt to perform daily activities including household and community ambulation      TREATMENT PROVIDED:  -Therapeutic Exercise:  10 min   - Bike - 5 min   - Elliptical - 1' x 2  -Evaluation - 25 min  -Education on proper posture, body mechanics and condition    PLAN:  Patient will benefit from physical therapy (2-3) x/week for (4-6) weeks including neuromuscular re-education, therapeutic exercise, functional activities, modalities, and patient education.    Thank you for this referral.    These services are reasonable and necessary for the conditions set forth above while under my care.

## 2017-07-19 ENCOUNTER — CLINICAL SUPPORT (OUTPATIENT)
Dept: REHABILITATION | Facility: HOSPITAL | Age: 62
End: 2017-07-19
Attending: FAMILY MEDICINE
Payer: MEDICARE

## 2017-07-19 DIAGNOSIS — R53.1 WEAKNESS: ICD-10-CM

## 2017-07-19 DIAGNOSIS — R26.2 DIFFICULTY WALKING: Primary | ICD-10-CM

## 2017-07-19 DIAGNOSIS — M48.02 CERVICAL STENOSIS OF SPINAL CANAL: ICD-10-CM

## 2017-07-19 PROCEDURE — 97112 NEUROMUSCULAR REEDUCATION: CPT

## 2017-07-19 PROCEDURE — 97110 THERAPEUTIC EXERCISES: CPT

## 2017-07-19 NOTE — PROGRESS NOTES
"PHYSICAL THERAPY DAILY NOTE    Referring Provider:  Dr. Tommie Farias    Diagnosis:       ICD-10-CM ICD-9-CM    1. Difficulty walking R26.2 719.7    2. Weakness R53.1 780.79    3. Cervical stenosis of spinal canal M48.02 723.0      Orders:  Evaluate and Treat    Date of Initial Evaluation: 7/11/17    Visit # 2 of 12    BACKGROUND: Patient is a 60 y/o male with difficulty walking and a history of falls.  He states that he began having trouble walking about 2 years ago following neck surgery.  He has also been diagnosed with an arachnoid cyst in his brain, but the patient's family appears unaware of that diagnosis. He states that he primarily uses a cane for ambulation but he has a RW for longer distance use.  He states that he has mild pain in his legs.    Subjective 7/19/17 - Patient reports that he's feeling good today. Patient's wife states that she has not yet scheduled any further medical testing to determine the underlying cause for his neurological symptoms (clonus, weakness, dec'd motor control)    OBJECTIVE      TREATMENT PROVIDED:  -Therapeutic Exercise:  15  min   - Bike - 6 min   - Elliptical - 1' x 2   - HS/calf str - 30" x 3    - TG squats - 5' 4 bands  -Neuromuscular re-education -  20 min   - Tandem walking   - Foam pad standing with eyes closed   - Foam beam walking   - Standing on balance pad with ball catch  -Education on proper posture, body mechanics and condition    ASSESSMENT: Patient able to demonstrate fair balance during activities, however, he had noticeable clonus in bilateral ankles that prevented normal balance responses.    PLAN:  Patient will benefit from physical therapy (2-3) x/week for (4-6) weeks including neuromuscular re-education, therapeutic exercise, functional activities, modalities, and patient education.          "

## 2017-07-31 ENCOUNTER — CLINICAL SUPPORT (OUTPATIENT)
Dept: REHABILITATION | Facility: HOSPITAL | Age: 62
End: 2017-07-31
Attending: FAMILY MEDICINE
Payer: MEDICARE

## 2017-07-31 DIAGNOSIS — R53.1 WEAKNESS: ICD-10-CM

## 2017-07-31 DIAGNOSIS — R26.2 DIFFICULTY WALKING: Primary | ICD-10-CM

## 2017-07-31 DIAGNOSIS — M48.02 CERVICAL STENOSIS OF SPINAL CANAL: ICD-10-CM

## 2017-07-31 PROCEDURE — 97112 NEUROMUSCULAR REEDUCATION: CPT

## 2017-07-31 PROCEDURE — 97110 THERAPEUTIC EXERCISES: CPT

## 2017-07-31 NOTE — PROGRESS NOTES
"PHYSICAL THERAPY DAILY NOTE    Referring Provider:  Dr. Tommie Farias    Diagnosis:       ICD-10-CM ICD-9-CM    1. Difficulty walking R26.2 719.7    2. Weakness R53.1 780.79    3. Cervical stenosis of spinal canal M48.02 723.0      Orders:  Evaluate and Treat    Date of Initial Evaluation: 7/11/17    Visit # 3 of 12    BACKGROUND: Patient is a 60 y/o male with difficulty walking and a history of falls.  He states that he began having trouble walking about 2 years ago following neck surgery.  He has also been diagnosed with an arachnoid cyst in his brain, but the patient's family appears unaware of that diagnosis. He states that he primarily uses a cane for ambulation but he has a RW for longer distance use.  He states that he has mild pain in his legs.    Subjective 7/31/17 - Patient reports that he's been experiencing more "shaking" in his legs lately.  Patient advised to seek further medical evaluation to determine origin of clonus.    OBJECTIVE      TREATMENT PROVIDED:  -Therapeutic Exercise:  15  min   - Bike - 6 min   - Elliptical - 1' x 2   - HS/calf str - 30" x 3    - TG squats - 5' 4 bands  -Neuromuscular re-education -  20 min   - Tandem walking   - Foam pad standing with eyes closed   - Foam beam walking   - Standing on balance pad with ball catch  -Education on proper posture, body mechanics and condition    ASSESSMENT:  Patient able to perform therex and balancing activities with frequent rest breaks due to fatigue.  Patient still demonstrating slow and rigid gait pattern as well as clonus.      PLAN:  Patient will benefit from physical therapy (2-3) x/week for (4-6) weeks including neuromuscular re-education, therapeutic exercise, functional activities, modalities, and patient education.          "

## 2017-08-02 ENCOUNTER — CLINICAL SUPPORT (OUTPATIENT)
Dept: REHABILITATION | Facility: HOSPITAL | Age: 62
End: 2017-08-02
Attending: FAMILY MEDICINE
Payer: MEDICARE

## 2017-08-02 DIAGNOSIS — M48.02 CERVICAL STENOSIS OF SPINAL CANAL: ICD-10-CM

## 2017-08-02 DIAGNOSIS — R26.2 DIFFICULTY WALKING: Primary | ICD-10-CM

## 2017-08-02 DIAGNOSIS — R53.1 WEAKNESS: ICD-10-CM

## 2017-08-02 PROCEDURE — 97110 THERAPEUTIC EXERCISES: CPT

## 2017-08-02 PROCEDURE — 97112 NEUROMUSCULAR REEDUCATION: CPT

## 2017-08-02 NOTE — PROGRESS NOTES
"PHYSICAL THERAPY DAILY NOTE    Referring Provider:  Dr. Tommie Farias    Diagnosis:       ICD-10-CM ICD-9-CM    1. Difficulty walking R26.2 719.7    2. Weakness R53.1 780.79    3. Cervical stenosis of spinal canal M48.02 723.0      Orders:  Evaluate and Treat    Date of Initial Evaluation: 7/11/17    Visit # 4 of 12    BACKGROUND: Patient is a 62 y/o male with difficulty walking and a history of falls.  He states that he began having trouble walking about 2 years ago following neck surgery.  He has also been diagnosed with an arachnoid cyst in his brain, but the patient's family appears unaware of that diagnosis. He states that he primarily uses a cane for ambulation but he has a RW for longer distance use.  He states that he has mild pain in his legs.    Subjective 8/2/17 - Patient reports that he's feeling good today.  He reports that he feels like he's getting stronger.    OBJECTIVE      TREATMENT PROVIDED:  -Therapeutic Exercise:  15  min   - Bike - 6 min   - Elliptical - 1' x 2   - HS/calf str - 30" x 3    - TG squats - 5' 4 bands  -Neuromuscular re-education -  20 min   - Tandem walking   - Foam pad standing with eyes closed   - Foam beam walking   - Standing on balance pad with ball catch  -Education on proper posture, body mechanics and condition    ASSESSMENT:  Pt demonstrating improved overall strength and balance with PT.  Patient with decreased assistance needed during balancing activities.    PLAN:  Patient will benefit from physical therapy (2-3) x/week for (4-6) weeks including neuromuscular re-education, therapeutic exercise, functional activities, modalities, and patient education.          "

## 2017-08-08 DIAGNOSIS — F25.9 SCHIZOAFFECTIVE DISORDER, CHRONIC CONDITION: ICD-10-CM

## 2017-08-08 DIAGNOSIS — E78.5 HYPERLIPIDEMIA, UNSPECIFIED HYPERLIPIDEMIA TYPE: Chronic | ICD-10-CM

## 2017-08-08 DIAGNOSIS — E11.49 TYPE 2 DIABETES MELLITUS WITH OTHER NEUROLOGIC COMPLICATION, UNSPECIFIED LONG TERM INSULIN USE STATUS: Chronic | ICD-10-CM

## 2017-08-09 ENCOUNTER — CLINICAL SUPPORT (OUTPATIENT)
Dept: REHABILITATION | Facility: HOSPITAL | Age: 62
End: 2017-08-09
Attending: FAMILY MEDICINE
Payer: MEDICARE

## 2017-08-09 DIAGNOSIS — G24.01 TARDIVE DYSKINESIA: ICD-10-CM

## 2017-08-09 DIAGNOSIS — R26.2 DIFFICULTY WALKING: Primary | ICD-10-CM

## 2017-08-09 DIAGNOSIS — R53.1 WEAKNESS: ICD-10-CM

## 2017-08-09 PROCEDURE — 97014 ELECTRIC STIMULATION THERAPY: CPT

## 2017-08-09 PROCEDURE — 97110 THERAPEUTIC EXERCISES: CPT

## 2017-08-09 RX ORDER — SIMVASTATIN 40 MG/1
40 TABLET, FILM COATED ORAL NIGHTLY
Qty: 30 TABLET | Refills: 6 | Status: SHIPPED | OUTPATIENT
Start: 2017-08-09 | End: 2018-01-19 | Stop reason: SDUPTHER

## 2017-08-09 RX ORDER — METFORMIN HYDROCHLORIDE 1000 MG/1
1000 TABLET ORAL 2 TIMES DAILY WITH MEALS
Qty: 60 TABLET | Refills: 6 | Status: SHIPPED | OUTPATIENT
Start: 2017-08-09 | End: 2017-11-07

## 2017-08-09 RX ORDER — DULOXETIN HYDROCHLORIDE 60 MG/1
60 CAPSULE, DELAYED RELEASE ORAL DAILY
Qty: 30 CAPSULE | Refills: 6 | Status: SHIPPED | OUTPATIENT
Start: 2017-08-09 | End: 2018-01-19 | Stop reason: SDUPTHER

## 2017-08-09 RX ORDER — AMANTADINE HYDROCHLORIDE 100 MG/1
100 CAPSULE, GELATIN COATED ORAL 2 TIMES DAILY
Qty: 60 CAPSULE | Refills: 0 | Status: SHIPPED | OUTPATIENT
Start: 2017-08-09

## 2017-08-09 RX ORDER — OLANZAPINE 7.5 MG/1
7.5 TABLET ORAL NIGHTLY
Qty: 30 TABLET | Refills: 6 | Status: SHIPPED | OUTPATIENT
Start: 2017-08-09

## 2017-08-09 RX ORDER — GABAPENTIN 600 MG/1
600 TABLET ORAL 2 TIMES DAILY
Qty: 60 TABLET | Refills: 6 | Status: SHIPPED | OUTPATIENT
Start: 2017-08-09 | End: 2018-01-19 | Stop reason: SDUPTHER

## 2017-08-09 NOTE — TELEPHONE ENCOUNTER
----- Message from Carol Al sent at 8/9/2017 11:03 AM CDT -----  Contact: pt's wife  She's calling to verify that RX was sent to [facility] on [address], she doesn't know the name of it, please advise [phone] (home)

## 2017-08-09 NOTE — PROGRESS NOTES
"PHYSICAL THERAPY DAILY NOTE    Referring Provider:  Dr. Tommie Farias    Diagnosis:       ICD-10-CM ICD-9-CM    1. Difficulty walking R26.2 719.7    2. Weakness R53.1 780.79      Orders:  Evaluate and Treat    Date of Initial Evaluation: 7/11/17    Visit # 5 of 12    BACKGROUND: Patient is a 62 y/o male with difficulty walking and a history of falls.  He states that he began having trouble walking about 2 years ago following neck surgery.  He has also been diagnosed with an arachnoid cyst in his brain, but the patient's family appears unaware of that diagnosis. He states that he primarily uses a cane for ambulation but he has a RW for longer distance use.  He states that he has mild pain in his legs.    Subjective 8/9/17 - Patient reports that his back is really sore today.    OBJECTIVE      TREATMENT PROVIDED:  -Therapeutic Exercise:  15  min   - Bike - 6 min   - Elliptical - held   - HS/calf str - 30" x 3    - TG squats - 5' 5 bands   - Bridging - 30x  -Neuromuscular re-education -  Held today   - Tandem walking   - Foam pad standing with eyes closed   - Foam beam walking   - Standing on balance pad with ball catch  -Education on proper posture, body mechanics and condition    ASSESSMENT:  Pt limited in therex today due to back pain. Patient able to perform some activities with only mild pain.    PLAN:  Patient will benefit from physical therapy (2-3) x/week for (4-6) weeks including neuromuscular re-education, therapeutic exercise, functional activities, modalities, and patient education.          "

## 2017-08-11 ENCOUNTER — CLINICAL SUPPORT (OUTPATIENT)
Dept: REHABILITATION | Facility: HOSPITAL | Age: 62
End: 2017-08-11
Attending: FAMILY MEDICINE
Payer: MEDICARE

## 2017-08-11 DIAGNOSIS — R26.2 DIFFICULTY WALKING: Primary | ICD-10-CM

## 2017-08-11 DIAGNOSIS — R53.1 WEAKNESS: ICD-10-CM

## 2017-08-11 PROCEDURE — 97112 NEUROMUSCULAR REEDUCATION: CPT

## 2017-08-11 PROCEDURE — 97110 THERAPEUTIC EXERCISES: CPT

## 2017-08-11 NOTE — PROGRESS NOTES
"PHYSICAL THERAPY DAILY NOTE    Referring Provider:  Dr. Tommie Farias    Diagnosis:       ICD-10-CM ICD-9-CM    1. Difficulty walking R26.2 719.7    2. Weakness R53.1 780.79      Orders:  Evaluate and Treat    Date of Initial Evaluation: 7/11/17    Visit # 6 of 12    BACKGROUND: Patient is a 60 y/o male with difficulty walking and a history of falls.  He states that he began having trouble walking about 2 years ago following neck surgery.  He has also been diagnosed with an arachnoid cyst in his brain, but the patient's family appears unaware of that diagnosis. He states that he primarily uses a cane for ambulation but he has a RW for longer distance use.  He states that he has mild pain in his legs.    Subjective 8/11/17 - Patient reports that his back is feeling better today than last visit.    OBJECTIVE      TREATMENT PROVIDED:  -Therapeutic Exercise:  20  min   - Bike - 6 min   - Elliptical - 3'   - HS/calf str - 30" x 3    - TG squats - 5' 5 bands   - Bridging - 30x  -Neuromuscular re-education -  20 min   - Tandem walking   - Foam pad standing with eyes closed   - Foam beam walking   - Standing on balance pad with ball catch  -Education on proper posture, body mechanics and condition    ASSESSMENT:  Patient with decreased back pain today.  Patient able to perform therex and balancing activities with minimal difficulty. Continued PT needed to improve mobility.    PLAN:  Patient will benefit from physical therapy (2-3) x/week for (4-6) weeks including neuromuscular re-education, therapeutic exercise, functional activities, modalities, and patient education.          "

## 2017-08-16 ENCOUNTER — CLINICAL SUPPORT (OUTPATIENT)
Dept: REHABILITATION | Facility: HOSPITAL | Age: 62
End: 2017-08-16
Attending: FAMILY MEDICINE
Payer: MEDICARE

## 2017-08-16 DIAGNOSIS — R26.2 DIFFICULTY WALKING: Primary | ICD-10-CM

## 2017-08-16 DIAGNOSIS — R53.1 WEAKNESS: ICD-10-CM

## 2017-08-16 PROCEDURE — 97112 NEUROMUSCULAR REEDUCATION: CPT

## 2017-08-16 PROCEDURE — G8978 MOBILITY CURRENT STATUS: HCPCS | Mod: CJ

## 2017-08-16 PROCEDURE — 97164 PT RE-EVAL EST PLAN CARE: CPT

## 2017-08-16 PROCEDURE — 97110 THERAPEUTIC EXERCISES: CPT

## 2017-08-16 PROCEDURE — G8979 MOBILITY GOAL STATUS: HCPCS | Mod: CI

## 2017-08-16 NOTE — PROGRESS NOTES
PHYSICAL THERAPY RE-EVALUATION    Referring Provider:  Dr. Tommie Farias    Diagnosis:       ICD-10-CM ICD-9-CM    1. Difficulty walking R26.2 719.7    2. Weakness R53.1 780.79      Orders:  Evaluate and Treat    Date of Initial Evaluation: 7/11/17    Visit # 7 of 12    BACKGROUND: Patient is a 62 y/o male with difficulty walking and a history of falls.  He states that he began having trouble walking about 2 years ago following neck surgery.  He has also been diagnosed with an arachnoid cyst in his brain, but the patient's family appears unaware of that diagnosis. He states that he primarily uses a cane for ambulation but he has a RW for longer distance use.  He states that he has mild pain in his legs.    Subjective 8/16/17 - Patient reports that he's feeling stronger since starting PT.    OBJECTIVE      Gait:  Patient ambulates with slow pace, wide RAFAELA, and decreased step length using str cane.        Strength:  Strength/Myotome   Right Left   L2 Hip Flexor 3-/5 3-/5   L3 Quad 3-/5 3-/5   L4 Hamstring 3-/5 3-/5   L5 ANT TIB 4/5 4/5      Neuro/Sensation:   Reflexes and Sensation impaired     Special Test:  Moderate HS tightness bilaterally;  TUG - 17 seconds with cane     Function:                  Patient reports 28% disability based on score of the Lower Extremity Functional scale.     Functional Limitations and Goal:   This patient's primary Physical Therapy goal is to improve his current level of function(Walking and moving around ) with minimal limitations. The patient's current level of impairment is 20-40% Impaired(CJ) based on their score of 28% impaired on the LEFS. The Patient is expected to achieve a score of 20-39%(CJ) within 10 treatments.     Short Term Goals:    1.I with HEP  PARTIALLY MET  2. Pt to increase BLE strength to 3+/5 or greater throughout. NOT MET  3. Pt to ambulate with AD for 250' with SBA. NEARLY MET  4. Pt to score less than 40% impaired on the mobility portion of the LEFS.   "MET  New goal: Pt to score less than 20% impairment.  Long Term Goals:  1.Pt to perform daily activities including household and community ambulation.  PARTIALLY MET    TREATMENT PROVIDED:  -Re-evaluation - 10 min  -Therapeutic Exercise:  20  min   - Bike - 6 min   - Elliptical - 2' X 2'   - HS/calf str - 30" x 3    - TG squats - 5' 5 bands   - Bridging - 30x  -Neuromuscular re-education -  20 min   - Tandem walking   - Foam pad standing with eyes closed   - Foam beam walking   - Standing on balance pad with ball catch  -Education on proper posture, body mechanics and condition    ASSESSMENT:  Patient demonstrating improved mobility and balance since starting PT 1 month ago.  He demonstrates improved endurance and improved balance per with narrow based support.  He would benefit from continued PT to continue to progress his mobility.    PLAN:  Patient will benefit from physical therapy (2-3) x/week for (4-6) weeks including neuromuscular re-education, therapeutic exercise, functional activities, modalities, and patient education.          "

## 2017-08-18 ENCOUNTER — CLINICAL SUPPORT (OUTPATIENT)
Dept: REHABILITATION | Facility: HOSPITAL | Age: 62
End: 2017-08-18
Attending: FAMILY MEDICINE
Payer: MEDICARE

## 2017-08-18 DIAGNOSIS — R26.2 DIFFICULTY WALKING: Primary | ICD-10-CM

## 2017-08-18 DIAGNOSIS — M48.02 CERVICAL STENOSIS OF SPINAL CANAL: ICD-10-CM

## 2017-08-18 DIAGNOSIS — R53.1 WEAKNESS: ICD-10-CM

## 2017-08-18 PROCEDURE — 97110 THERAPEUTIC EXERCISES: CPT

## 2017-08-18 PROCEDURE — 97112 NEUROMUSCULAR REEDUCATION: CPT

## 2017-08-18 NOTE — PROGRESS NOTES
"PHYSICAL THERAPY DAILY NOTE    Referring Provider:  Dr. Tommie Farias    Diagnosis:       ICD-10-CM ICD-9-CM    1. Difficulty walking R26.2 719.7    2. Weakness R53.1 780.79    3. Cervical stenosis of spinal canal M48.02 723.0      Orders:  Evaluate and Treat    Date of Initial Evaluation: 7/11/17    Visit # 8 of 12    BACKGROUND: Patient is a 60 y/o male with difficulty walking and a history of falls.  He states that he began having trouble walking about 2 years ago following neck surgery.  He has also been diagnosed with an arachnoid cyst in his brain, but the patient's family appears unaware of that diagnosis. He states that he primarily uses a cane for ambulation but he has a RW for longer distance use.  He states that he has mild pain in his legs.    Subjective 8/18/17 - Patient reports that he's feeling stronger since starting PT.    OBJECTIVE  Updated 8/16    TREATMENT PROVIDED:  -Therapeutic Exercise:  20  min   - Bike - 6 min   - Elliptical - 3' X 1  2' x 1   - HS/calf str - 30" x 3    - TG squats - 5' 5 bands   - Bridging - 30x  -Neuromuscular re-education -  20 min   - Tandem walking   - Foam pad standing with eyes closed   - Standing on balance pad with ball catch  -Education on proper posture, body mechanics and condition    ASSESSMENT:  Patient progressing well with increased strength, endurance, and balance with PT.  Patient still with significant clonus noted in bilateral ankles with therex which inhibits his balance rxns at times.      PLAN:  Patient will benefit from physical therapy (2-3) x/week for (4-6) weeks including neuromuscular re-education, therapeutic exercise, functional activities, modalities, and patient education.          "

## 2017-08-23 ENCOUNTER — CLINICAL SUPPORT (OUTPATIENT)
Dept: REHABILITATION | Facility: HOSPITAL | Age: 62
End: 2017-08-23
Attending: FAMILY MEDICINE
Payer: MEDICARE

## 2017-08-23 DIAGNOSIS — R26.2 DIFFICULTY WALKING: Primary | ICD-10-CM

## 2017-08-23 PROCEDURE — 97110 THERAPEUTIC EXERCISES: CPT

## 2017-08-23 PROCEDURE — 97112 NEUROMUSCULAR REEDUCATION: CPT

## 2017-08-23 NOTE — PROGRESS NOTES
"PHYSICAL THERAPY DAILY NOTE    Referring Provider:  Dr. Tommie Farias    Diagnosis:       ICD-10-CM ICD-9-CM    1. Difficulty walking R26.2 719.7      Orders:  Evaluate and Treat    Date of Initial Evaluation: 7/11/17    Visit # 9 of 12    BACKGROUND: Patient is a 62 y/o male with difficulty walking and a history of falls.  He states that he began having trouble walking about 2 years ago following neck surgery.  He has also been diagnosed with an arachnoid cyst in his brain, but the patient's family appears unaware of that diagnosis. He states that he primarily uses a cane for ambulation but he has a RW for longer distance use.  He states that he has mild pain in his legs.    Subjective 8/23/17 - Patient without new complaints.    OBJECTIVE  Updated 8/16    TREATMENT PROVIDED:  -Therapeutic Exercise:  20  min   - Bike - 6 min   - Elliptical - 3' X 1  2' x 1   - HS/calf str - 30" x 3    - TG squats - 5' 5 bands   - Bridging - 30x  -Neuromuscular re-education -  10 min   - Foam beam walking   - Foam pad standing with eyes closed  -Education on proper posture, body mechanics and condition    ASSESSMENT:  Patient demonstrating improved balance and mobility with static activities.  Continued PT needed to improve mobility.    PLAN:  Patient will benefit from physical therapy (2-3) x/week for (4-6) weeks including neuromuscular re-education, therapeutic exercise, functional activities, modalities, and patient education.          "

## 2017-08-30 ENCOUNTER — CLINICAL SUPPORT (OUTPATIENT)
Dept: REHABILITATION | Facility: HOSPITAL | Age: 62
End: 2017-08-30
Attending: FAMILY MEDICINE
Payer: MEDICARE

## 2017-08-30 DIAGNOSIS — R53.1 WEAKNESS: ICD-10-CM

## 2017-08-30 DIAGNOSIS — R26.2 DIFFICULTY WALKING: Primary | ICD-10-CM

## 2017-08-30 PROCEDURE — 97112 NEUROMUSCULAR REEDUCATION: CPT

## 2017-08-30 PROCEDURE — 97110 THERAPEUTIC EXERCISES: CPT

## 2017-08-30 NOTE — PROGRESS NOTES
"PHYSICAL THERAPY DAILY NOTE    Referring Provider:  Dr. Tommie Farias    Diagnosis:       ICD-10-CM ICD-9-CM    1. Difficulty walking R26.2 719.7    2. Weakness R53.1 780.79      Orders:  Evaluate and Treat    Date of Initial Evaluation: 7/11/17    Visit # 10 of 12    BACKGROUND: Patient is a 60 y/o male with difficulty walking and a history of falls.  He states that he began having trouble walking about 2 years ago following neck surgery.  He has also been diagnosed with an arachnoid cyst in his brain, but the patient's family appears unaware of that diagnosis. He states that he primarily uses a cane for ambulation but he has a RW for longer distance use.  He states that he has mild pain in his legs.    Subjective 8/30/17 - Patient reports that he's been doing good and getting around the house better.    OBJECTIVE  Updated 8/16    TREATMENT PROVIDED:  -Therapeutic Exercise:  25  min   - Bike - 6 min   - Elliptical - 3' X 1  2' x 1   - HS/calf str - 30" x 3    - TG squats - 5' 5 bands   - Bridging - 30x  -Neuromuscular re-education -  15 min   - Foam beam walking FW/SW   - Foam pad standing with eyes closed  -Education on proper posture, body mechanics and condition    ASSESSMENT:  Patient tolerated treatment well.  Patient demonstrating improved strength, mobility, and balance with PT.      PLAN:  Probable DC next week          "

## 2017-09-07 ENCOUNTER — INITIAL CONSULT (OUTPATIENT)
Dept: PSYCHIATRY | Facility: CLINIC | Age: 62
End: 2017-09-07
Payer: COMMERCIAL

## 2017-09-07 DIAGNOSIS — F20.9 SCHIZOPHRENIA, UNSPECIFIED TYPE: Primary | ICD-10-CM

## 2017-09-07 PROCEDURE — 90792 PSYCH DIAG EVAL W/MED SRVCS: CPT | Mod: S$GLB,,, | Performed by: PSYCHIATRY & NEUROLOGY
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
## 2017-09-14 ENCOUNTER — LAB VISIT (OUTPATIENT)
Dept: LAB | Facility: HOSPITAL | Age: 62
End: 2017-09-14
Attending: INTERNAL MEDICINE
Payer: MEDICARE

## 2017-09-14 DIAGNOSIS — E11.49 DIABETIC NEUROPATHY WITH NEUROLOGIC COMPLICATION: ICD-10-CM

## 2017-09-14 DIAGNOSIS — E11.40 DIABETIC NEUROPATHY WITH NEUROLOGIC COMPLICATION: ICD-10-CM

## 2017-09-14 LAB
ESTIMATED AVG GLUCOSE: 272 MG/DL
HBA1C MFR BLD HPLC: 11.1 %

## 2017-09-14 PROCEDURE — 36415 COLL VENOUS BLD VENIPUNCTURE: CPT | Mod: PO

## 2017-09-14 PROCEDURE — 83036 HEMOGLOBIN GLYCOSYLATED A1C: CPT

## 2017-09-21 ENCOUNTER — NUTRITION (OUTPATIENT)
Dept: DIABETES | Facility: CLINIC | Age: 62
End: 2017-09-21
Payer: MEDICARE

## 2017-09-21 ENCOUNTER — OFFICE VISIT (OUTPATIENT)
Dept: ENDOCRINOLOGY | Facility: CLINIC | Age: 62
End: 2017-09-21
Payer: MEDICARE

## 2017-09-21 VITALS
SYSTOLIC BLOOD PRESSURE: 123 MMHG | HEIGHT: 65 IN | TEMPERATURE: 96 F | WEIGHT: 190.69 LBS | DIASTOLIC BLOOD PRESSURE: 86 MMHG | HEART RATE: 87 BPM | BODY MASS INDEX: 31.77 KG/M2

## 2017-09-21 VITALS — BODY MASS INDEX: 31.59 KG/M2 | WEIGHT: 189.63 LBS | HEIGHT: 65 IN

## 2017-09-21 DIAGNOSIS — E11.49 DIABETIC NEUROPATHY WITH NEUROLOGIC COMPLICATION: Primary | ICD-10-CM

## 2017-09-21 DIAGNOSIS — E11.49 TYPE 2 DIABETES MELLITUS WITH OTHER NEUROLOGIC COMPLICATION, UNSPECIFIED LONG TERM INSULIN USE STATUS: Primary | ICD-10-CM

## 2017-09-21 DIAGNOSIS — E11.40 DIABETIC NEUROPATHY WITH NEUROLOGIC COMPLICATION: Primary | ICD-10-CM

## 2017-09-21 DIAGNOSIS — F31.9 BIPOLAR 1 DISORDER: ICD-10-CM

## 2017-09-21 DIAGNOSIS — E78.5 HYPERLIPIDEMIA, UNSPECIFIED HYPERLIPIDEMIA TYPE: ICD-10-CM

## 2017-09-21 PROCEDURE — 99999 PR PBB SHADOW E&M-EST. PATIENT-LVL III: CPT | Mod: PBBFAC,,, | Performed by: INTERNAL MEDICINE

## 2017-09-21 PROCEDURE — G0108 DIAB MANAGE TRN  PER INDIV: HCPCS | Mod: S$GLB,,, | Performed by: DIETITIAN, REGISTERED

## 2017-09-21 PROCEDURE — 99999 PR PBB SHADOW E&M-EST. PATIENT-LVL III: CPT | Mod: PBBFAC,,, | Performed by: DIETITIAN, REGISTERED

## 2017-09-21 PROCEDURE — 3046F HEMOGLOBIN A1C LEVEL >9.0%: CPT | Mod: S$GLB,,, | Performed by: INTERNAL MEDICINE

## 2017-09-21 PROCEDURE — 3008F BODY MASS INDEX DOCD: CPT | Mod: S$GLB,,, | Performed by: INTERNAL MEDICINE

## 2017-09-21 PROCEDURE — 99214 OFFICE O/P EST MOD 30 MIN: CPT | Mod: S$GLB,,, | Performed by: INTERNAL MEDICINE

## 2017-09-21 NOTE — PATIENT INSTRUCTIONS
Increase victoza to 1.8mg    Stop regular sodas and sweet drinks- only water or sugar free    Bring meter to every visit

## 2017-09-21 NOTE — PROGRESS NOTES
""This note will be shared with the patient" Patient ID: Ramu Jones is a 62 y.o. male.  Patient is here for follow up        Chief Complaint: Diabetes Mellitus      HPI  Ramu Jones is here for follow-up of type 2 Diabetes Mellitus    Consultation requested by Nona Garcia    PCP: Rashel Doyle MD      Diagnosed:   Lab Results   Component Value Date    HGBA1C 11.1 (H) 2017         Lab Results   Component Value Date    HGBA1C 7.3 (H) 06/15/2017    HGBA1C 9.4 10/31/2016    HGBA1C 7.7 (H) 2015       Diabetes medications include: Lantus 23 qhs; a;so on  Metformin 1gram BID ac and victoza 1.2 -he admits that he had gotten off with his medications over the summer and just restarted back on month ago    Diabetes Complications:peripheral neuropathy  Has history of bipolar disorder  Hypoglycemia: NO      Meal Planning: Diet has been poor, has been drinking soft drinks and eating lots of sweets    Diabetes education: YES:      SMBG: Tests BG 2 to 3 times a day    Log or meter available: no    Home values if available:  FBs to 140s  Pre-lunch: 100s  Pre-dinner:  Bedtime:  Post Breakfast:  Post Lunch  Post Dinner  Ranges: later in day at bedtime 200s    Exercise: No     STANDARDS of CARE:        ACE inhibitor or angiotensin II receptor blocker:   No        Statin drug:  Yes        Eye exam within last year: Yes        Influenza vaccine up to date: Yes        Pneumonia vaccine: Yes -             I have reviewed the past medical, family and social history    Review of Systems   Constitutional: Negative for appetite change, diaphoresis, fatigue, fever and unexpected weight change.   HENT: Negative for sore throat and trouble swallowing.    Eyes: Negative for visual disturbance.   Respiratory: Negative for shortness of breath and wheezing.    Cardiovascular: Negative for chest pain, palpitations and leg swelling.   Gastrointestinal: Negative for abdominal pain, diarrhea, nausea and " vomiting.   Endocrine: Negative for cold intolerance, heat intolerance, polydipsia, polyphagia and polyuria.   Genitourinary: Negative for difficulty urinating and dysuria.   Musculoskeletal: Positive for gait problem. Negative for arthralgias and joint swelling.   Skin: Negative for color change and rash.   Neurological: Positive for numbness. Negative for dizziness, tremors and headaches.   Hematological: Negative for adenopathy.   Psychiatric/Behavioral: Negative for confusion, dysphoric mood and sleep disturbance. The patient is not nervous/anxious.        Objective:      Physical Exam   Constitutional: He appears well-developed and well-nourished. No distress.   HENT:   Head: Normocephalic and atraumatic.   Eyes: Conjunctivae are normal.   Neurological: He is alert. No sensory deficit.        Skin: Skin is warm and dry. No rash noted. He is not diaphoretic. No erythema.   Psychiatric: He has a normal mood and affect. His behavior is normal.   Vitals reviewed.        Lab Review:   Lab Visit on 09/14/2017   Component Date Value    Hemoglobin A1C 09/14/2017 11.1*    Estimated Avg Glucose 09/14/2017 272*       Assessment:     1. Diabetic neuropathy with neurologic complication  Lipid panel    Hemoglobin A1c    Comprehensive metabolic panel    A1c very high as he had been off of medication and his diet is poor;Increase victoza to 1.8mg; no sweet drinks only sugar free and water   2. Hyperlipidemia, unspecified hyperlipidemia type  Lipid panel    Stable on statin   3. Bipolar 1 disorder      Stable on medication and follows up with psychiatry      Plan:   Diabetic neuropathy with neurologic complication  Comments:  A1c very high as he had been off of medication and his diet is poor;Increase victoza to 1.8mg; no sweet drinks only sugar free and water  Orders:  -     Lipid panel; Future; Expected date: 09/21/2017  -     Hemoglobin A1c; Future; Expected date: 09/21/2017  -     Comprehensive metabolic panel; Future;  Expected date: 09/21/2017    Hyperlipidemia, unspecified hyperlipidemia type  Comments:  Stable on statin  Orders:  -     Lipid panel; Future; Expected date: 09/21/2017    Bipolar 1 disorder  Comments:  Stable on medication and follows up with psychiatry          Return in about 3 months (around 12/21/2017).    Labs prior to appointment? yes

## 2017-09-21 NOTE — PROGRESS NOTES
Diabetes Education  Author: Kalina Padilla RD, CDE  Date: 9/21/2017    Referring Provider: Nona Garcia NP  62 y.o. male in clinic today for diabetes education class f/u. Patient has taken diabetes education courses in the past. T2DM diagnosis 5-10 years now. Patient is currently taking metformin 1000 mg BID, Lantus 23 units every evening, Victoza 1.2 mg daily for diabetes.  Patient stopped taking Victoza and Lantus for a period throughout the summer due to cost. He restarted meds last month. Patient reports fasting ranges 137-150; acl 120s; HS readings 200-300s. Last A1c:   A1C:   Hemoglobin A1C   Date Value Ref Range Status   09/14/2017 11.1 (H) 4.0 - 5.6 % Final     Comment:     According to ADA guidelines, hemoglobin A1c <7.0% represents  optimal control in non-pregnant diabetic patients. Different  metrics may apply to specific patient populations.   Standards of Medical Care in Diabetes-2016.  For the purpose of screening for the presence of diabetes:  <5.7%     Consistent with the absence of diabetes  5.7-6.4%  Consistent with increasing risk for diabetes   (prediabetes)  >or=6.5%  Consistent with diabetes  Currently, no consensus exists for use of hemoglobin A1c  for diagnosis of diabetes for children.  This Hemoglobin A1c assay has significant interference with fetal   hemoglobin   (HbF). The results are invalid for patients with abnormal amounts of   HbF,   including those with known Hereditary Persistence   of Fetal Hemoglobin. Heterozygous hemoglobin variants (HbAS, HbAC,   HbAD, HbAE, HbA2) do not significantly interfere with this assay;   however, presence of multiple variants in a sample may impact the %   interference.           Diabetes Education Visit  Diabetes Education Record Assessment/Progress: Post Program/Follow-up    Diabetes Type  Diabetes Type : Type II    Diabetes History  Diabetes Diagnosis: 5-10 years (A1c higher from class - patient restarted Lantus & Victoza last month after  The patient is a 47y Male complaining of chest pressure. running out of med and unable to afford)    Nutrition  Meal Planning: drinks regular soda, 3 meals per day  Meal Plan 24 Hour Recall - Breakfast: milk, grits or oats, egg, johnson  Meal Plan 24 Hour Recall - Lunch: ham/cheese sandwich, french fries - Sprite  Meal Plan 24 Hour Recall - Dinner: Spaghetti homemade - Sprite  Meal Plan 24 Hour Recall - Snack: No    Monitoring   Self Monitoring : wife handles Rx for supplies - patient reports fasting ranges 137-150; acl 120s; HS readings 200-300s  Blood Glucose Logs: No    Exercise   Frequency: Never    Current Diabetes Treatment   Current Treatment: Diet, Oral Medication, Insulin    Social History  Smoking Status: Current Smoker      Barriers to Change  Barriers to Change: Cognitive  Learning Challenges : None    Readiness to Learn   Readiness to Learn : Acceptance    Cultural Influences  Cultural Influences: No    Diabetes Care Plan/Intervention  Education Plan/Intervention: Individual Follow-Up DSMT   Patient has f/u with endo today. Patient will continue regular SMBG and bring in records for education visits. Review of carb counting with emphasis on sugary beverages. Discussed medication mgt, costs, assistance for future. 4 month recall for continued DM edu. support.     Education Units of Time   Time Spent: 30 min      Health Maintenance Topics with due status: Not Due       Topic Last Completion Date    Lipid Panel 06/15/2017    Urine Microalbumin 06/15/2017    Foot Exam 06/29/2017    Hemoglobin A1c 09/14/2017     Health Maintenance Due   Topic Date Due    Hepatitis C Screening  1955    Eye Exam  07/22/1966    TETANUS VACCINE  07/22/1974    Pneumococcal PPSV23 (Medium Risk) (1) 07/22/1974    Colonoscopy  07/22/2006    Zoster Vaccine  07/22/2016    Influenza Vaccine  08/01/2017   Patient reports annual eye exam in [place] - [facility] every year -Unsure of provider name/location

## 2017-10-12 ENCOUNTER — PATIENT OUTREACH (OUTPATIENT)
Dept: ADMINISTRATIVE | Facility: HOSPITAL | Age: 62
End: 2017-10-12

## 2017-11-13 ENCOUNTER — OFFICE VISIT (OUTPATIENT)
Dept: OPHTHALMOLOGY | Facility: CLINIC | Age: 62
End: 2017-11-13
Payer: MEDICARE

## 2017-11-13 DIAGNOSIS — Z13.5 GLAUCOMA SCREENING: ICD-10-CM

## 2017-11-13 DIAGNOSIS — H52.7 REFRACTIVE ERROR: ICD-10-CM

## 2017-11-13 DIAGNOSIS — E11.9 DIABETES MELLITUS TYPE 2 WITHOUT RETINOPATHY: Primary | ICD-10-CM

## 2017-11-13 PROCEDURE — 92004 COMPRE OPH EXAM NEW PT 1/>: CPT | Mod: S$GLB,,, | Performed by: OPTOMETRIST

## 2017-11-13 PROCEDURE — 99999 PR PBB SHADOW E&M-EST. PATIENT-LVL I: CPT | Mod: PBBFAC,,, | Performed by: OPTOMETRIST

## 2017-11-13 PROCEDURE — 92015 DETERMINE REFRACTIVE STATE: CPT | Mod: S$GLB,,, | Performed by: OPTOMETRIST

## 2017-11-13 NOTE — LETTER
November 13, 2017      Kalina Padilla RD, CDE  [address] [place] [address]           [facility]  [address]  [place] [address]  Phone: [phone]  Fax: [phone]          Patient: Ramu Jones   MR Number: [id number]   YOB: 1955   Date of Visit: 11/13/2017       Dear Kalina Padilla:    Thank you for referring Ramu Jones to me for evaluation. Attached you will find relevant portions of my assessment and plan of care.    If you have questions, please do not hesitate to call me. I look forward to following Ramu Jones along with you.    Sincerely,    ART Bliss, OD    Enclosure  CC:  No Recipients    If you would like to receive this communication electronically, please contact [email or web address] or [phone] to request more information on [facility] [employer] access.    For providers and/or their staff who would like to refer a patient to [facility], please contact us through our one-stop-shop provider referral line, [facility], at [phone].    If you feel you have received this communication in error or would no longer like to receive these types of communications, please e-mail [email or web address]

## 2017-11-13 NOTE — PROGRESS NOTES
HPI     New Patient  Diabetic/IDDM  Screening for glaucoma  RE  No visual complaints  Did not bring glasses to exam    Last edited by Sarkis Newberry MA on 11/13/2017  9:16 AM. (History)            Assessment /Plan     For exam results, see Encounter Report.    Diabetes mellitus type 2 without retinopathy    Glaucoma screening    Refractive error      No diabetic retinopathy OU.  OH OK OU.  Spec Rx given.  RTC one year.

## 2018-01-19 ENCOUNTER — LAB VISIT (OUTPATIENT)
Dept: LAB | Facility: HOSPITAL | Age: 63
End: 2018-01-19
Attending: INTERNAL MEDICINE
Payer: MEDICARE

## 2018-01-19 ENCOUNTER — NUTRITION (OUTPATIENT)
Dept: DIABETES | Facility: CLINIC | Age: 63
End: 2018-01-19
Payer: MEDICARE

## 2018-01-19 ENCOUNTER — OFFICE VISIT (OUTPATIENT)
Dept: INTERNAL MEDICINE | Facility: CLINIC | Age: 63
End: 2018-01-19
Payer: MEDICARE

## 2018-01-19 VITALS — WEIGHT: 186.94 LBS | HEIGHT: 65 IN | BODY MASS INDEX: 31.14 KG/M2

## 2018-01-19 VITALS
OXYGEN SATURATION: 96 % | WEIGHT: 186.31 LBS | DIASTOLIC BLOOD PRESSURE: 82 MMHG | SYSTOLIC BLOOD PRESSURE: 118 MMHG | HEIGHT: 65 IN | BODY MASS INDEX: 31.04 KG/M2 | TEMPERATURE: 96 F | HEART RATE: 95 BPM

## 2018-01-19 DIAGNOSIS — N48.22 CELLULITIS, PENIS: Primary | ICD-10-CM

## 2018-01-19 DIAGNOSIS — E11.49 DIABETIC NEUROPATHY WITH NEUROLOGIC COMPLICATION: ICD-10-CM

## 2018-01-19 DIAGNOSIS — E11.40 DIABETIC NEUROPATHY WITH NEUROLOGIC COMPLICATION: ICD-10-CM

## 2018-01-19 DIAGNOSIS — E78.5 HYPERLIPIDEMIA, UNSPECIFIED HYPERLIPIDEMIA TYPE: ICD-10-CM

## 2018-01-19 DIAGNOSIS — E78.5 HYPERLIPIDEMIA, UNSPECIFIED HYPERLIPIDEMIA TYPE: Chronic | ICD-10-CM

## 2018-01-19 DIAGNOSIS — E11.49 TYPE 2 DIABETES MELLITUS WITH OTHER NEUROLOGIC COMPLICATION, UNSPECIFIED LONG TERM INSULIN USE STATUS: Primary | ICD-10-CM

## 2018-01-19 DIAGNOSIS — E11.49 TYPE 2 DIABETES MELLITUS WITH OTHER NEUROLOGIC COMPLICATION, UNSPECIFIED LONG TERM INSULIN USE STATUS: Chronic | ICD-10-CM

## 2018-01-19 PROCEDURE — 80061 LIPID PANEL: CPT

## 2018-01-19 PROCEDURE — G0108 DIAB MANAGE TRN  PER INDIV: HCPCS | Mod: S$GLB,,, | Performed by: DIETITIAN, REGISTERED

## 2018-01-19 PROCEDURE — 99999 PR PBB SHADOW E&M-EST. PATIENT-LVL III: CPT | Mod: PBBFAC,,, | Performed by: DIETITIAN, REGISTERED

## 2018-01-19 PROCEDURE — 83036 HEMOGLOBIN GLYCOSYLATED A1C: CPT

## 2018-01-19 PROCEDURE — 99213 OFFICE O/P EST LOW 20 MIN: CPT | Mod: S$GLB,,, | Performed by: NURSE PRACTITIONER

## 2018-01-19 PROCEDURE — 80053 COMPREHEN METABOLIC PANEL: CPT

## 2018-01-19 PROCEDURE — 99999 PR PBB SHADOW E&M-EST. PATIENT-LVL III: CPT | Mod: PBBFAC,,, | Performed by: NURSE PRACTITIONER

## 2018-01-19 PROCEDURE — 36415 COLL VENOUS BLD VENIPUNCTURE: CPT | Mod: PO

## 2018-01-19 PROCEDURE — 99499 UNLISTED E&M SERVICE: CPT | Mod: S$GLB,,, | Performed by: NURSE PRACTITIONER

## 2018-01-19 RX ORDER — METFORMIN HYDROCHLORIDE 1000 MG/1
1000 TABLET ORAL 2 TIMES DAILY WITH MEALS
Qty: 90 TABLET | Refills: 3 | Status: SHIPPED | OUTPATIENT
Start: 2018-01-19

## 2018-01-19 RX ORDER — MUPIROCIN 20 MG/G
OINTMENT TOPICAL 2 TIMES DAILY
Qty: 15 G | Refills: 0 | Status: SHIPPED | OUTPATIENT
Start: 2018-01-19 | End: 2018-02-02

## 2018-01-19 RX ORDER — SIMVASTATIN 40 MG/1
40 TABLET, FILM COATED ORAL NIGHTLY
Qty: 90 TABLET | Refills: 3 | Status: SHIPPED | OUTPATIENT
Start: 2018-01-19

## 2018-01-19 RX ORDER — DULOXETIN HYDROCHLORIDE 60 MG/1
60 CAPSULE, DELAYED RELEASE ORAL DAILY
Qty: 90 CAPSULE | Refills: 3 | Status: SHIPPED | OUTPATIENT
Start: 2018-01-19

## 2018-01-19 RX ORDER — DOXYCYCLINE 100 MG/1
100 CAPSULE ORAL EVERY 12 HOURS
Qty: 20 CAPSULE | Refills: 0 | Status: SHIPPED | OUTPATIENT
Start: 2018-01-19 | End: 2018-01-29

## 2018-01-19 RX ORDER — METFORMIN HYDROCHLORIDE 1000 MG/1
1000 TABLET ORAL 2 TIMES DAILY WITH MEALS
Refills: 4 | COMMUNITY
Start: 2017-11-12 | End: 2018-01-19 | Stop reason: SDUPTHER

## 2018-01-19 RX ORDER — GABAPENTIN 600 MG/1
600 TABLET ORAL 2 TIMES DAILY
Qty: 180 TABLET | Refills: 3 | Status: SHIPPED | OUTPATIENT
Start: 2018-01-19 | End: 2019-01-19

## 2018-01-19 NOTE — PROGRESS NOTES
Subjective:       Patient ID: Ramu Jones is a 62 y.o. male.    Chief Complaint: Medication Refill and Penis Pain    Need for medication refills for 90 days.     Penile redness. Patient's wife reports that he had a circumcision surgery completed 3 years ago in [place], but have recently had some issues with it. He reports redness and itching. He has been wrapping his penis with tissue paper daily. There is drainage according to him and it is painful to touch. No penile discharge, fever, testicular pain, or dysuria.      Review of Systems   Constitutional: Negative for chills, fatigue and fever.   HENT: Negative for congestion, ear pain, postnasal drip, rhinorrhea, sinus pressure, sneezing and sore throat.    Eyes: Negative for photophobia, pain and visual disturbance.   Respiratory: Negative for cough, chest tightness and shortness of breath.    Cardiovascular: Negative for chest pain, palpitations and leg swelling.   Gastrointestinal: Negative for abdominal pain, constipation, diarrhea, nausea and vomiting.   Genitourinary: Positive for penile pain and penile swelling. Negative for decreased urine volume, difficulty urinating, discharge, dysuria, enuresis, flank pain, frequency, genital sores, hematuria, scrotal swelling, testicular pain and urgency.   Musculoskeletal: Negative for arthralgias.   Neurological: Negative for dizziness, light-headedness and headaches.   Psychiatric/Behavioral: Negative for sleep disturbance.       Objective:      Physical Exam   Genitourinary: Penile erythema and penile tenderness present. No discharge found.             Assessment:       1. Cellulitis, penis    2. Type 2 diabetes mellitus with other neurologic complication, unspecified long term insulin use status    3. Hyperlipidemia, unspecified hyperlipidemia type        Plan:   Cellulitis, penis  -     doxycycline (VIBRAMYCIN) 100 MG Cap; Take 1 capsule (100 mg total) by mouth every 12 (twelve) hours.  Dispense: 20  capsule; Refill: 0  -     mupirocin (BACTROBAN) 2 % ointment; Apply topically 2 (two) times daily.  Dispense: 15 g; Refill: 0    Type 2 diabetes mellitus with other neurologic complication, unspecified long term insulin use status  -     gabapentin (NEURONTIN) 600 MG tablet; Take 1 tablet (600 mg total) by mouth 2 (two) times daily.  Dispense: 180 tablet; Refill: 3  -     liraglutide 0.6 mg/0.1 mL, 18 mg/3 mL, subq PNIJ 0.6 mg/0.1 mL (18 mg/3 mL) PnIj; Inject 1.2 mg into the skin once daily.  Dispense: 18 mL; Refill: 3    Hyperlipidemia, unspecified hyperlipidemia type  -     simvastatin (ZOCOR) 40 MG tablet; Take 1 tablet (40 mg total) by mouth every evening.  Dispense: 90 tablet; Refill: 3    Other orders  -     metFORMIN (GLUCOPHAGE) 1000 MG tablet; Take 1 tablet (1,000 mg total) by mouth 2 (two) times daily with meals.  Dispense: 90 tablet; Refill: 3  -     DULoxetine (CYMBALTA) 60 MG capsule; Take 1 capsule (60 mg total) by mouth once daily.  Dispense: 90 capsule; Refill: 3      Refills as above- 90 day supply per pt request  Penile cellluitis- doxy BID x 10 days and bactroban- follow up if s/s worsen or fail to improve

## 2018-01-19 NOTE — PROGRESS NOTES
Diabetes Education  Author: Kalina Padilla RD, CDE  Date: 1/19/2018    Referring Provider: Gus, Melissareferral  62 y.o. male in clinic today for diabetes education f/u. T2DM over 10 years now. Patient missed December appt/labs with ENDO. No SMBG at home, although patient recently received new meter/supplies from insurance.   DM MEDICATIONS:  Lantus 23 u every night, Victoza 1.2 mg daily, metformin 1000 mg BID    Hemoglobin A1C   Date Value Ref Range Status   09/14/2017 11.1 (H) 4.0 - 5.6 % Final     Comment:     According to ADA guidelines, hemoglobin A1c <7.0% represents  optimal control in non-pregnant diabetic patients. Different  metrics may apply to specific patient populations.   Standards of Medical Care in Diabetes-2016.  For the purpose of screening for the presence of diabetes:  <5.7%     Consistent with the absence of diabetes  5.7-6.4%  Consistent with increasing risk for diabetes   (prediabetes)  >or=6.5%  Consistent with diabetes  Currently, no consensus exists for use of hemoglobin A1c  for diagnosis of diabetes for children.  This Hemoglobin A1c assay has significant interference with fetal   hemoglobin   (HbF). The results are invalid for patients with abnormal amounts of   HbF,   including those with known Hereditary Persistence   of Fetal Hemoglobin. Heterozygous hemoglobin variants (HbAS, HbAC,   HbAD, HbAE, HbA2) do not significantly interfere with this assay;   however, presence of multiple variants in a sample may impact the %   interference.               Diabetes Education Visit  Diabetes Education Record Assessment/Progress: Post Program/Follow-up    Diabetes Type  Diabetes Type : Type II    Diabetes History  Diabetes Diagnosis: >10 years (5 lbs weight loss)    Nutrition  What type of beverages do you drink?: water, diet soda/tea  Meal Plan 24 Hour Recall - Breakfast: milk, grits or oats, egg, johnson  Meal Plan 24 Hour Recall - Lunch: ham/cheese sandwich, french fries - Diet Coke  Meal Plan 24  Hour Recall - Dinner: Belinda homemade - Diet Coke  Meal Plan 24 Hour Recall - Snack: No    Monitoring   Self Monitoring : no BG monitoring at home - has supplies - recently got new machine from mail order  Blood Glucose Logs: No    Exercise   Frequency: Never    Current Diabetes Treatment   Current Treatment: Diet, Injectable, Insulin, Oral Medication    Social History  Preferred Learning Method: Face to Face  Primary Support: Self, Spouse  Occupation: disability  Smoking Status: Current Smoker  Alcohol Use: Never    Barriers to Change  Barriers to Change: Cognitive  Learning Challenges : None    Readiness to Learn   Readiness to Learn : Acceptance    Cultural Influences  Cultural Influences: No    Diabetes Education Assessment/Progress  Diabetes Disease Process (diabetes disease process and treatment options): Discussion, Individual Session  Nutrition (Incorporating nutritional management into one's lifestyle): Discussion, Individual Session  Physical Activity (incorporating physical activity into one's lifestyle): Discussion, Individual Session  Medications (states correct name, dose, onset, peak, duration, side effects & timing of meds): Discussion, Individual Session  Monitoring (monitoring blood glucose/other parameters & using results): Discussion, Individual Session  Acute Complications (preventing, detecting, and treating acute complications): Discussion, Individual Session  Chronic Complications (preventing, detecting, and treating chronic complications): Discussion, Individual Session  Clinical (diabetes and other pertinent medical history): Discussion, Individual Session  Cognitive (knowledge of self-management skills, functional health literacy): Discussion, Individual Session  Psychosocial (emotional response to diabetes): Discussion, Individual Session  Diabetes Distress and Support Systems: Discussion, Individual Session  Behavioral (readiness for change, lifestyle practices, self-care behaviors):  Discussion, Individual Session    Diabetes Care Plan/Intervention  Education Plan/Intervention: Individual Follow-Up DSMT  Labs resched for today. Appt coordinated with ENDO for follow up med mgt. Patient encouraged to restart regular SMBG at home. 3 month f/u with CDE.    Diabetes Meal Plan  Restrictions: Low Fat, Low Sodium  Calories: 1600  Carbohydrate Per Meal: 45-60g  Carbohydrate Per Snack : 15-30g    Education Units of Time   Time Spent: 30 min      Health Maintenance Topics with due status: Not Due       Topic Last Completion Date    Lipid Panel 06/15/2017    Urine Microalbumin 06/15/2017    Foot Exam 06/29/2017    Hemoglobin A1c 09/14/2017    Eye Exam 11/13/2017     Health Maintenance Due   Topic Date Due    Hepatitis C Screening  1955    TETANUS VACCINE  07/22/1974    Pneumococcal PPSV23 (Medium Risk) (1) 07/22/1974    Colonoscopy  07/22/2006    Zoster Vaccine  07/22/2016    Influenza Vaccine  08/01/2017

## 2018-01-20 LAB
ALBUMIN SERPL BCP-MCNC: 4.4 G/DL
ALP SERPL-CCNC: 107 U/L
ALT SERPL W/O P-5'-P-CCNC: 10 U/L
ANION GAP SERPL CALC-SCNC: 11 MMOL/L
AST SERPL-CCNC: 9 U/L
BILIRUB SERPL-MCNC: 0.3 MG/DL
BUN SERPL-MCNC: 11 MG/DL
CALCIUM SERPL-MCNC: 10.4 MG/DL
CHLORIDE SERPL-SCNC: 105 MMOL/L
CHOLEST SERPL-MCNC: 105 MG/DL
CHOLEST/HDLC SERPL: 3.3 {RATIO}
CO2 SERPL-SCNC: 26 MMOL/L
CREAT SERPL-MCNC: 0.9 MG/DL
EST. GFR  (AFRICAN AMERICAN): >60 ML/MIN/1.73 M^2
EST. GFR  (NON AFRICAN AMERICAN): >60 ML/MIN/1.73 M^2
ESTIMATED AVG GLUCOSE: 243 MG/DL
GLUCOSE SERPL-MCNC: 127 MG/DL
HBA1C MFR BLD HPLC: 10.1 %
HDLC SERPL-MCNC: 32 MG/DL
HDLC SERPL: 30.5 %
LDLC SERPL CALC-MCNC: 41.4 MG/DL
NONHDLC SERPL-MCNC: 73 MG/DL
POTASSIUM SERPL-SCNC: 4 MMOL/L
PROT SERPL-MCNC: 7.9 G/DL
SODIUM SERPL-SCNC: 142 MMOL/L
TRIGL SERPL-MCNC: 158 MG/DL

## 2018-03-08 LAB
CHOLEST SERPL-MSCNC: 88 MG/DL (ref 0–200)
HBA1C MFR BLD: 9.4 %
HDLC SERPL-MCNC: 31 MG/DL
LDLC SERPL CALC-MCNC: 42 MG/DL
NONHDLC SERPL-MCNC: 57 MG/DL
TRIGL SERPL-MCNC: 75 MG/DL

## 2018-03-21 DIAGNOSIS — F25.9 SCHIZOAFFECTIVE DISORDER, CHRONIC CONDITION: ICD-10-CM

## 2018-03-21 RX ORDER — OLANZAPINE 7.5 MG/1
TABLET ORAL
Qty: 30 TABLET | Refills: 2 | OUTPATIENT
Start: 2018-03-21

## 2018-05-08 ENCOUNTER — PATIENT OUTREACH (OUTPATIENT)
Dept: ADMINISTRATIVE | Facility: HOSPITAL | Age: 63
End: 2018-05-08

## 2018-05-08 NOTE — LETTER
May 8, 2018    Ramu Jones  [address]  [place] [address]           [facility]  [address]  [facility] [address]  Phone: [phone] Dear Mr. Jones:    [facility] is committed to your overall health.  To help you get the most out of each of your visits, we will review your information to make sure you are up to date on all of your recommended tests and/or procedures.      Tommie Farias MD has found that you may be due for   Health Maintenance Due   Topic    TETANUS VACCINE     Colonoscopy     Zoster Vaccine     Urine Microalbumin     Foot Exam       If you have had any of the above done at another facility, please bring the records or information with you so that your record at [facility] will be complete.    If you are currently taking medication, please bring it with you to your appointment for review.    We will be happy to assist you with scheduling any necessary appointments or you may contact the [facility] appointment desk at [phone] to schedule at your convenience.     Thank you for choosing [facility] for your healthcare needs,    If you have any questions or concerns, please don't hesitate to call.    Sincerely,    RAUL Perez  Care Coordination Department  [facility]  [phone]

## 2018-12-27 ENCOUNTER — PATIENT OUTREACH (OUTPATIENT)
Dept: ADMINISTRATIVE | Facility: HOSPITAL | Age: 63
End: 2018-12-27

## 2019-04-05 DIAGNOSIS — Z12.11 COLON CANCER SCREENING: ICD-10-CM

## 2019-04-16 ENCOUNTER — PATIENT OUTREACH (OUTPATIENT)
Dept: ADMINISTRATIVE | Facility: HOSPITAL | Age: 64
End: 2019-04-16

## 2019-05-07 ENCOUNTER — PATIENT OUTREACH (OUTPATIENT)
Dept: ADMINISTRATIVE | Facility: HOSPITAL | Age: 64
End: 2019-05-07

## 2020-10-19 NOTE — PROGRESS NOTES
Health Maintenance Due   Topic Date Due   • Influenza Vaccine (1 of 2) 09/01/2020   • MMR Vaccine (1 of 2 - Standard series) 10/17/2020   • Varicella Vaccine (1 of 2 - 2-dose childhood series) 10/17/2020   • HIB Vaccine (3 of 3 - PRP-OMP Series) 10/17/2020   • Hepatitis A Vaccine (1 of 2 - 2-dose series) 10/17/2020   • Pneumococcal Vaccine 0-64 (4 of 4) 10/17/2020       Patient is due for topics listed above, he wishes to proceed with Influenza, Hep A, and MMR, but is not proceeding with Immunization(s) HIB and Pneumococcal and Varicella (see other note) at this time. The following has occurred: Patient is following pediatric unified immunization schedule for immunizations.     Vaccine Information Statement(s) was given today. This has been reviewed, questions answered, and verbal consent given by Parent for injection(s) and administration of Hepatitis A, Influenza (Inactivated), Measles/Mumps/Rubella (MMR).    Patient tolerated without incident. See immunization grid for documentation.       Subjective:     Patient ID: Ramu Jones is a 61 y.o. male.    Chief Complaint: Diabetic Foot Exam (Patient states he does experience numbness and tingling in both feet) and Nail Care (Last PCP visit with - 6/19/17. Today's glucose- 167 mg/dL. )    Ramu is a 61 y.o. male who presents to the clinic for evaluation and treatment of high risk feet. Ramu has a past medical history of Asthma; Bipolar 1 disorder; Diabetes mellitus; Renal disorder; and Schizophrenia. The patient's chief complaint is long, thick toenails. Patient states his blood sugar 167mg/dl. Patient also complaining of numbness and tingling  This patient has documented high risk feet requiring routine maintenance secondary to diabetes mellitis and those secondary complications of diabetes, as mentioned..    PCP: Tommie Farias MD    Date Last Seen by PCP: 6/19/17    Current shoe gear:  Affected Foot: Tennis shoes     Unaffected Foot: Tennis shoes    Hemoglobin A1C   Date Value Ref Range Status   06/15/2017 7.3 (H) 4.0 - 5.6 % Final     Comment:     According to ADA guidelines, hemoglobin A1c <7.0% represents  optimal control in non-pregnant diabetic patients. Different  metrics may apply to specific patient populations.   Standards of Medical Care in Diabetes-2016.  For the purpose of screening for the presence of diabetes:  <5.7%     Consistent with the absence of diabetes  5.7-6.4%  Consistent with increasing risk for diabetes   (prediabetes)  >or=6.5%  Consistent with diabetes  Currently, no consensus exists for use of hemoglobin A1c  for diagnosis of diabetes for children.  This Hemoglobin A1c assay has significant interference with fetal   hemoglobin   (HbF). The results are invalid for patients with abnormal amounts of   HbF,   including those with known Hereditary Persistence   of Fetal Hemoglobin. Heterozygous hemoglobin variants (HbAS, HbAC,   HbAD, HbAE, HbA2) do not significantly interfere with this assay;   however,  "presence of multiple variants in a sample may impact the %   interference.     10/31/2016 9.4  Final   02/20/2015 7.7 (H) 4.5 - 6.2 % Final           Patient Active Problem List   Diagnosis    Left-sided weakness    Hyperlipidemia    Long-term insulin use in type 2 diabetes    Asthma    Bipolar 1 disorder    Schizophrenia    Tobacco abuse    Cervical stenosis of spinal canal    Brain cyst    Weakness    Difficulty walking    Pain aggravated by walking    Strain of left triceps    Tremors of nervous system    Arachnoid cyst       Medication List with Changes/Refills   New Medications    CLOTRIMAZOLE-BETAMETHASONE 1-0.05% (LOTRISONE) CREAM    Apply topically 2 (two) times daily.   Current Medications    AMANTADINE HCL (SYMMETREL) 100 MG CAPSULE    Take 1 capsule (100 mg total) by mouth 2 (two) times daily.    BD INSULIN PEN NEEDLE UF SHORT 31 GAUGE X 5/16" NDLE    To be used twice daily.    DULOXETINE (CYMBALTA) 60 MG CAPSULE    Take 1 capsule (60 mg total) by mouth once daily.    ERGOCALCIFEROL (ERGOCALCIFEROL) 50,000 UNIT CAP    Take 1 capsule (50,000 Units total) by mouth every 7 days.    GABAPENTIN (NEURONTIN) 600 MG TABLET    Take 1 tablet (600 mg total) by mouth 2 (two) times daily.    INSULIN GLARGINE (LANTUS SOLOSTAR) 100 UNIT/ML (3 ML) INPN PEN    Inject 18 Units into the skin every evening.    LIRAGLUTIDE 0.6 MG/0.1 ML, 18 MG/3 ML, SUBQ PNIJ 0.6 MG/0.1 ML (18 MG/3 ML) PNIJ    Inject 1.2 mg into the skin once daily.    METFORMIN (GLUCOPHAGE) 1000 MG TABLET    Take 1 tablet (1,000 mg total) by mouth 2 (two) times daily with meals.    OLANZAPINE (ZYPREXA) 7.5 MG TABLET    Take 1 tablet (7.5 mg total) by mouth every evening.    PEN NEEDLE, DIABETIC (BD INSULIN PEN NEEDLE UF ORIG) 29 GAUGE X 1/2" NDLE    1 Device by Misc.(Non-Drug; Combo Route) route 2 (two) times daily.    SIMVASTATIN (ZOCOR) 40 MG TABLET    Take 1 tablet (40 mg total) by mouth every evening.       Review of patient's allergies " "indicates:   Allergen Reactions    Grape Anaphylaxis    Pcn [penicillins] Anaphylaxis    Saphris [asenapine] Other (See Comments)     TD/EPS       Past Surgical History:   Procedure Laterality Date    HERNIA REPAIR      SHOULDER SURGERY Left        Family History   Problem Relation Age of Onset    Schizophrenia Sister     Arthritis Sister     Asthma Sister     Depression Sister     Diabetes Sister     Hyperlipidemia Sister     Hypertension Sister     Mental illness Sister     Schizophrenia Brother     Asthma Brother     Diabetes Brother     Learning disabilities Brother     Mental illness Brother     Asthma Mother     Diabetes Mother     Mental illness Mother     Asthma Father     Mental illness Father        Social History     Social History    Marital status:      Spouse name: N/A    Number of children: 2    Years of education: N/A     Occupational History    Not on file.     Social History Main Topics    Smoking status: Current Every Day Smoker     Packs/day: 1.00     Years: 30.00     Start date: 1/1/1977    Smokeless tobacco: Never Used    Alcohol use No    Drug use: No    Sexual activity: Yes     Partners: Female     Other Topics Concern    Not on file     Social History Narrative    No narrative on file       Vitals:    06/29/17 1424   BP: 123/86   Pulse: 87   Weight: 86.5 kg (190 lb 11.2 oz)   Height: 5' 5" (1.651 m)   PainSc: 0-No pain       Hemoglobin A1C   Date Value Ref Range Status   06/15/2017 7.3 (H) 4.0 - 5.6 % Final     Comment:     According to ADA guidelines, hemoglobin A1c <7.0% represents  optimal control in non-pregnant diabetic patients. Different  metrics may apply to specific patient populations.   Standards of Medical Care in Diabetes-2016.  For the purpose of screening for the presence of diabetes:  <5.7%     Consistent with the absence of diabetes  5.7-6.4%  Consistent with increasing risk for diabetes   (prediabetes)  >or=6.5%  Consistent with " diabetes  Currently, no consensus exists for use of hemoglobin A1c  for diagnosis of diabetes for children.  This Hemoglobin A1c assay has significant interference with fetal   hemoglobin   (HbF). The results are invalid for patients with abnormal amounts of   HbF,   including those with known Hereditary Persistence   of Fetal Hemoglobin. Heterozygous hemoglobin variants (HbAS, HbAC,   HbAD, HbAE, HbA2) do not significantly interfere with this assay;   however, presence of multiple variants in a sample may impact the %   interference.     10/31/2016 9.4  Final   02/20/2015 7.7 (H) 4.5 - 6.2 % Final       Review of Systems   Constitutional: Negative for chills and fever.   Respiratory: Negative for shortness of breath.    Cardiovascular: Negative for chest pain, palpitations, orthopnea, claudication and leg swelling.   Gastrointestinal: Negative for diarrhea, nausea and vomiting.   Musculoskeletal: Negative for joint pain.   Skin: Negative for rash.   Neurological: Positive for tingling and sensory change. Negative for dizziness, focal weakness and weakness.   Psychiatric/Behavioral: Negative.            Objective:      PHYSICAL EXAM: Apperance: Alert and orient in no distress,well developed, and with good attention to grooming and body habits  Patient presents ambulating in tennis shoes.   LOWER EXTREMITY EXAM:  VASCULAR: Dorsalis pedis pulses 2/4 bilateral and Posterior Tibial pulses 1/4 bilateral. Capillary fill time <4 seconds bilateral. Mild edema observed bilateral. Varicosities present bilateral. Skin temperature of the lower extremities is warm to cool, proximal to distal. Hair growth absent bilateral.  DERMATOLOGICAL: No skin rashes, subcutaneous nodules, lesions, or ulcers observed bilateral. Nails 1,2,3,4,5 bilateral normal length and thickness. Webspaces 1-4 clean, dry and without evidence of break in skin integrity bilateral. Dry and scaly skin noted plantarly bilateral.   NEUROLOGICAL: Light touch,  sharp-dull, proprioception all present and equal bilaterally.  Vibratory sensation diminished at bilateral hallux. Protective sensation intact at all 10 sites as tested with a [place] 5.07 monofilament.   MUSCULOSKELETAL: Muscle strength is 5/5 for foot inverters, everters, plantarflexors, and dorsiflexors. Muscle tone is normal.         Assessment:       Encounter Diagnoses   Name Primary?    Comprehensive diabetic foot examination, type 2 DM, encounter for Yes    Tinea pedis of both feet          Plan:   Comprehensive diabetic foot examination, type 2 DM, encounter for    Tinea pedis of both feet  -     clotrimazole-betamethasone 1-0.05% (LOTRISONE) cream; Apply topically 2 (two) times daily.  Dispense: 45 g; Refill: 2      I counseled the patient on his conditions, regarding findings of my examination, my impressions, and usual treatment plan.   Greater than 50% of this visit spent on counseling and coordination of care.  Greater than 20 minutes spent on education about the diabetic foot, neuropathy, and prevention of limb loss.  Shoe inspection. Diabetic Foot Education. Patient reminded of the importance of good nutrition and blood sugar control to help prevent podiatric complications of diabetes. Patient instructed on proper foot hygeine. We discussed wearing proper shoe gear, daily foot inspections, never walking without protective shoe gear, never putting sharp instruments to feet.    Discuss treatment options for athletes feet.  I explained that fungus lives in a warm dark moist environment and therefore patient should make every attempt to keep feet clean and dry.  We discussed drying feet thoroughly after shower particularly between the toes.   Patient instructed to spray all shoes with Lysol disinfectant spray and let dry before wearing. Patient instructed to wash all socks in hot water and bleach.  Prescribed Lotrisone cream to be applied twice daily to areas of feet.   Discussed with patient  treatments for neuropathy consisting of topical or oral medication.  Recommendations given for over-the-counter medicine such as [place] and/or Capsaicin.  Counseled patient on daily foot inspections and proper shoe gear.  Patient  will continue to monitor the areas daily, inspect feet, wear protective shoe gear when ambulatory, moisturizer to maintain skin integrity. Patient reminded of the importance of good nutrition and blood sugar control to help prevent podiatric complications of diabetes.  Patient to return 6 months or sooner if needed.                 Elo Esparza DPM  [facility]

## 2021-07-28 ENCOUNTER — TELEPHONE (OUTPATIENT)
Dept: INTERNAL MEDICINE | Facility: CLINIC | Age: 66
End: 2021-07-28

## 2021-09-06 ENCOUNTER — HOSPITAL ENCOUNTER (EMERGENCY)
Facility: HOSPITAL | Age: 66
Discharge: HOME | End: 2021-09-06
Attending: EMERGENCY MEDICINE
Payer: COMMERCIAL

## 2021-09-06 VITALS
DIASTOLIC BLOOD PRESSURE: 70 MMHG | HEART RATE: 88 BPM | HEIGHT: 65 IN | TEMPERATURE: 98 F | RESPIRATION RATE: 17 BRPM | SYSTOLIC BLOOD PRESSURE: 128 MMHG | BODY MASS INDEX: 30.23 KG/M2 | OXYGEN SATURATION: 98 % | WEIGHT: 181.44 LBS

## 2021-09-06 DIAGNOSIS — N39.0 URINARY TRACT INFECTION WITHOUT HEMATURIA, SITE UNSPECIFIED: ICD-10-CM

## 2021-09-06 DIAGNOSIS — R73.9 HYPERGLYCEMIA: Primary | ICD-10-CM

## 2021-09-06 DIAGNOSIS — E87.5 HYPERKALEMIA: ICD-10-CM

## 2021-09-06 LAB
ALBUMIN SERPL BCP-MCNC: 4.3 G/DL (ref 3.5–5.2)
ALP SERPL-CCNC: 105 U/L (ref 55–135)
ALT SERPL W/O P-5'-P-CCNC: 22 U/L (ref 10–44)
ANION GAP SERPL CALC-SCNC: 15 MMOL/L (ref 8–16)
AST SERPL-CCNC: 32 U/L (ref 10–40)
B-OH-BUTYR BLD STRIP-SCNC: 0.6 MMOL/L (ref 0–0.5)
BACTERIA #/AREA URNS HPF: ABNORMAL /HPF
BASOPHILS # BLD AUTO: 0.03 K/UL (ref 0–0.2)
BASOPHILS NFR BLD: 0.4 % (ref 0–1.9)
BILIRUB SERPL-MCNC: 0.2 MG/DL (ref 0.1–1)
BILIRUB UR QL STRIP: NEGATIVE
BUN SERPL-MCNC: 13 MG/DL (ref 8–23)
CALCIUM SERPL-MCNC: 10.1 MG/DL (ref 8.7–10.5)
CHLORIDE SERPL-SCNC: 103 MMOL/L (ref 95–110)
CLARITY UR: CLEAR
CO2 SERPL-SCNC: 20 MMOL/L (ref 23–29)
COLOR UR: YELLOW
CREAT SERPL-MCNC: 1 MG/DL (ref 0.5–1.4)
DIFFERENTIAL METHOD: ABNORMAL
EOSINOPHIL # BLD AUTO: 0.5 K/UL (ref 0–0.5)
EOSINOPHIL NFR BLD: 5.6 % (ref 0–8)
ERYTHROCYTE [DISTWIDTH] IN BLOOD BY AUTOMATED COUNT: 13.3 % (ref 11.5–14.5)
EST. GFR  (AFRICAN AMERICAN): >60 ML/MIN/1.73 M^2
EST. GFR  (NON AFRICAN AMERICAN): >60 ML/MIN/1.73 M^2
GLUCOSE SERPL-MCNC: 239 MG/DL (ref 70–110)
GLUCOSE UR QL STRIP: ABNORMAL
HCT VFR BLD AUTO: 40 % (ref 40–54)
HCV AB SERPL QL IA: NEGATIVE
HEP C VIRUS HOLD SPECIMEN: NORMAL
HGB BLD-MCNC: 12.9 G/DL (ref 14–18)
HGB UR QL STRIP: NEGATIVE
IMM GRANULOCYTES # BLD AUTO: 0.02 K/UL (ref 0–0.04)
IMM GRANULOCYTES NFR BLD AUTO: 0.2 % (ref 0–0.5)
KETONES UR QL STRIP: ABNORMAL
LEUKOCYTE ESTERASE UR QL STRIP: NEGATIVE
LYMPHOCYTES # BLD AUTO: 3.3 K/UL (ref 1–4.8)
LYMPHOCYTES NFR BLD: 39.7 % (ref 18–48)
MCH RBC QN AUTO: 30 PG (ref 27–31)
MCHC RBC AUTO-ENTMCNC: 32.3 G/DL (ref 32–36)
MCV RBC AUTO: 93 FL (ref 82–98)
MICROSCOPIC COMMENT: ABNORMAL
MONOCYTES # BLD AUTO: 0.5 K/UL (ref 0.3–1)
MONOCYTES NFR BLD: 6.4 % (ref 4–15)
NEUTROPHILS # BLD AUTO: 3.9 K/UL (ref 1.8–7.7)
NEUTROPHILS NFR BLD: 47.7 % (ref 38–73)
NITRITE UR QL STRIP: NEGATIVE
NRBC BLD-RTO: 0 /100 WBC
PH UR STRIP: 6 [PH] (ref 5–8)
PLATELET # BLD AUTO: 179 K/UL (ref 150–450)
PMV BLD AUTO: 9.8 FL (ref 9.2–12.9)
POCT GLUCOSE: 223 MG/DL (ref 70–110)
POTASSIUM SERPL-SCNC: 5.3 MMOL/L (ref 3.5–5.1)
PROT SERPL-MCNC: 8.6 G/DL (ref 6–8.4)
PROT UR QL STRIP: NEGATIVE
RBC # BLD AUTO: 4.3 M/UL (ref 4.6–6.2)
SODIUM SERPL-SCNC: 138 MMOL/L (ref 136–145)
SP GR UR STRIP: 1.02 (ref 1–1.03)
URN SPEC COLLECT METH UR: ABNORMAL
UROBILINOGEN UR STRIP-ACNC: NEGATIVE EU/DL
WBC # BLD AUTO: 8.22 K/UL (ref 3.9–12.7)
WBC #/AREA URNS HPF: 3 /HPF (ref 0–5)
WBC CLUMPS URNS QL MICRO: ABNORMAL
YEAST URNS QL MICRO: ABNORMAL

## 2021-09-06 PROCEDURE — 93010 EKG 12-LEAD: ICD-10-PCS | Mod: ,,, | Performed by: INTERNAL MEDICINE

## 2021-09-06 PROCEDURE — 80053 COMPREHEN METABOLIC PANEL: CPT | Performed by: NURSE PRACTITIONER

## 2021-09-06 PROCEDURE — 36415 COLL VENOUS BLD VENIPUNCTURE: CPT | Performed by: EMERGENCY MEDICINE

## 2021-09-06 PROCEDURE — 81000 URINALYSIS NONAUTO W/SCOPE: CPT | Performed by: NURSE PRACTITIONER

## 2021-09-06 PROCEDURE — 96360 HYDRATION IV INFUSION INIT: CPT

## 2021-09-06 PROCEDURE — 82010 KETONE BODYS QUAN: CPT | Performed by: NURSE PRACTITIONER

## 2021-09-06 PROCEDURE — 93005 ELECTROCARDIOGRAM TRACING: CPT

## 2021-09-06 PROCEDURE — 82962 GLUCOSE BLOOD TEST: CPT

## 2021-09-06 PROCEDURE — 85025 COMPLETE CBC W/AUTO DIFF WBC: CPT | Performed by: EMERGENCY MEDICINE

## 2021-09-06 PROCEDURE — 25000003 PHARM REV CODE 250: Performed by: NURSE PRACTITIONER

## 2021-09-06 PROCEDURE — 86803 HEPATITIS C AB TEST: CPT | Performed by: EMERGENCY MEDICINE

## 2021-09-06 PROCEDURE — 93010 ELECTROCARDIOGRAM REPORT: CPT | Mod: ,,, | Performed by: INTERNAL MEDICINE

## 2021-09-06 PROCEDURE — 99284 EMERGENCY DEPT VISIT MOD MDM: CPT | Mod: 25

## 2021-09-06 RX ORDER — CEFDINIR 300 MG/1
300 CAPSULE ORAL 2 TIMES DAILY
Qty: 14 CAPSULE | Refills: 0 | Status: SHIPPED | OUTPATIENT
Start: 2021-09-06 | End: 2021-09-13

## 2021-09-06 RX ADMIN — SODIUM CHLORIDE 1000 ML: 0.9 INJECTION, SOLUTION INTRAVENOUS at 09:09

## 2021-09-07 LAB — POCT GLUCOSE: 245 MG/DL (ref 70–110)

## 2021-10-06 DIAGNOSIS — M25.561 BILATERAL KNEE PAIN: Primary | ICD-10-CM

## 2021-10-06 DIAGNOSIS — R53.1 LEFT-SIDED WEAKNESS: Primary | ICD-10-CM

## 2021-10-06 DIAGNOSIS — M25.562 BILATERAL KNEE PAIN: Primary | ICD-10-CM

## 2021-10-06 DIAGNOSIS — G93.0 ARACHNOID CYST: Primary | ICD-10-CM

## 2021-10-18 ENCOUNTER — OFFICE VISIT (OUTPATIENT)
Dept: ORTHOPEDICS | Facility: CLINIC | Age: 66
End: 2021-10-18
Payer: COMMERCIAL

## 2021-10-18 VITALS
HEART RATE: 102 BPM | HEIGHT: 65 IN | DIASTOLIC BLOOD PRESSURE: 89 MMHG | SYSTOLIC BLOOD PRESSURE: 133 MMHG | RESPIRATION RATE: 18 BRPM | BODY MASS INDEX: 30.23 KG/M2 | WEIGHT: 181.44 LBS

## 2021-10-18 DIAGNOSIS — M17.0 OSTEOARTHRITIS OF BOTH KNEES, UNSPECIFIED OSTEOARTHRITIS TYPE: Primary | ICD-10-CM

## 2021-10-18 DIAGNOSIS — R29.898 WEAKNESS OF LOWER EXTREMITY, UNSPECIFIED LATERALITY: ICD-10-CM

## 2021-10-18 PROCEDURE — 99999 PR PBB SHADOW E&M-EST. PATIENT-LVL III: CPT | Mod: PBBFAC,,, | Performed by: PHYSICIAN ASSISTANT

## 2021-10-18 PROCEDURE — 99203 PR OFFICE/OUTPT VISIT, NEW, LEVL III, 30-44 MIN: ICD-10-PCS | Mod: S$GLB,,, | Performed by: PHYSICIAN ASSISTANT

## 2021-10-18 PROCEDURE — 99999 PR PBB SHADOW E&M-EST. PATIENT-LVL III: ICD-10-PCS | Mod: PBBFAC,,, | Performed by: PHYSICIAN ASSISTANT

## 2021-10-18 PROCEDURE — 99203 OFFICE O/P NEW LOW 30 MIN: CPT | Mod: S$GLB,,, | Performed by: PHYSICIAN ASSISTANT

## 2022-06-17 ENCOUNTER — OFFICE VISIT (OUTPATIENT)
Dept: UROLOGY | Facility: CLINIC | Age: 67
End: 2022-06-17
Payer: COMMERCIAL

## 2022-06-17 VITALS
WEIGHT: 174.38 LBS | HEART RATE: 112 BPM | RESPIRATION RATE: 18 BRPM | DIASTOLIC BLOOD PRESSURE: 86 MMHG | BODY MASS INDEX: 29.77 KG/M2 | HEIGHT: 64 IN | SYSTOLIC BLOOD PRESSURE: 137 MMHG

## 2022-06-17 DIAGNOSIS — C61 PROSTATE CANCER: ICD-10-CM

## 2022-06-17 DIAGNOSIS — R30.0 DYSURIA: Primary | ICD-10-CM

## 2022-06-17 LAB — POC RESIDUAL URINE VOLUME: 0 ML (ref 0–100)

## 2022-06-17 PROCEDURE — 99999 PR PBB SHADOW E&M-EST. PATIENT-LVL V: CPT | Mod: PBBFAC,,, | Performed by: NURSE PRACTITIONER

## 2022-06-17 PROCEDURE — 99999 PR PBB SHADOW E&M-EST. PATIENT-LVL V: ICD-10-PCS | Mod: PBBFAC,,, | Performed by: NURSE PRACTITIONER

## 2022-06-17 PROCEDURE — 87086 URINE CULTURE/COLONY COUNT: CPT | Performed by: NURSE PRACTITIONER

## 2022-06-17 PROCEDURE — 51798 US URINE CAPACITY MEASURE: CPT | Mod: S$GLB,,, | Performed by: NURSE PRACTITIONER

## 2022-06-17 PROCEDURE — 51798 POCT BLADDER SCAN: ICD-10-PCS | Mod: S$GLB,,, | Performed by: NURSE PRACTITIONER

## 2022-06-17 PROCEDURE — 99203 PR OFFICE/OUTPT VISIT, NEW, LEVL III, 30-44 MIN: ICD-10-PCS | Mod: S$GLB,,, | Performed by: NURSE PRACTITIONER

## 2022-06-17 PROCEDURE — 99203 OFFICE O/P NEW LOW 30 MIN: CPT | Mod: S$GLB,,, | Performed by: NURSE PRACTITIONER

## 2022-06-17 RX ORDER — GLIPIZIDE 10 MG/1
10 TABLET, FILM COATED, EXTENDED RELEASE ORAL
COMMUNITY
Start: 2021-07-28

## 2022-06-17 RX ORDER — CIPROFLOXACIN 500 MG/1
500 TABLET ORAL EVERY 12 HOURS
Qty: 20 TABLET | Refills: 0 | OUTPATIENT
Start: 2022-06-17 | End: 2022-06-17

## 2022-06-17 RX ORDER — ACETAMINOPHEN 500 MG
1 TABLET ORAL
COMMUNITY
Start: 2021-07-28

## 2022-06-17 RX ORDER — CIPROFLOXACIN 500 MG/1
500 TABLET ORAL EVERY 12 HOURS
Qty: 20 TABLET | Refills: 0 | Status: SHIPPED | OUTPATIENT
Start: 2022-06-17 | End: 2022-06-27

## 2022-06-17 RX ORDER — MAGNESIUM GLUCONATE 27.5 (500)
500 TABLET ORAL
COMMUNITY
Start: 2021-07-28

## 2022-06-17 NOTE — PROGRESS NOTES
"Subjective:      Ramu Jones is a 66 y.o. male who was presents today for evaluation.      He is a poor historian; documented history of mental illness and schizophrenia. [facility]  He is unsure why he is here today.   Per chart review he was diagnosed with paraphimosis in 2013; now s/p circumcision   Per chart review he was diagnosed with prostate cancer in 2013; Biopsy on 12/11/12. [place] 3+3=6; he opted for active surveillance but there is no documented follow up.     The following portions of the patient's history were reviewed and updated as appropriate: allergies, current medications, past family history, past medical history, past social history, past surgical history and problem list.    Review of Systems  Unable to obtain      Objective:   Vitals: /86 (BP Location: Left arm, Patient Position: Sitting, BP Method: Small (Automatic))   Pulse (!) 112   Resp 18   Ht 5' 4" (1.626 m)   Wt 79.1 kg (174 lb 6.1 oz)   BMI 29.93 kg/m²     Physical Exam   General: very pleasant,  no acute distress  Head: normocephalic  Neck: supple  Respiratory: Symmetric expansion  Cardiovascular: regular rate and rhythm  Abdomen: soft, non tender  Genitourinary:   Penis: circumcised, normal, no lesions, patent orthotopic meatus, no plaques, hypopigmentation noted to glans   Scrotum: no rashes or skin changes;   Skin: normal coloration and turgor, no rashes, no suspicious skin lesions noted  Neuro: alert   Psych: non-anxious    Physical Exam    Lab Review   Urinalysis demonstrates- sent for    Lab Results   Component Value Date    WBC 8.22 09/06/2021    HGB 12.9 (L) 09/06/2021    HCT 40.0 09/06/2021    MCV 93 09/06/2021     09/06/2021     Lab Results   Component Value Date    CREATININE 1.0 09/06/2021    BUN 13 09/06/2021     No results found for: PSA  1.  Prostate, right apex, needle core biopsy:  Benign prostatic tissue,  negative for malignancy.    2.  Prostate, right mid, " needle core biopsy:  Minute focus of prostatic  adenocarcinoma, Jose's score 3+3=6, involving two of two cores with less  than 1 mm total length and less than 5% total volume of involvement.  No  perineural invasion identified.  See Comment.    3.  Prostate, right base, needle core biopsy:  Benign prostatic tissue,  negative for malignancy.    4.  Prostate, left apex, needle core biopsy:  Focal high-grade prostatic  intraepithelial neoplasia (HGPIN).    5.  Prostate, left mid, needle core biopsy:  Benign prostatic tissue,  negative for malignancy.    6.  Prostate, left base, needle core biopsy:  Benign prostatic tissue,  negative for malignancy.    COMMENT: High molecular weight cytokeratin and p63 immunostains performed  on right mid prostate biopsy (Part #2) support the above diagnosis of  prostatic adenocarcinoma.    Bladder Scan PVR: 0cc     Assessment and Plan:   1. Dysuria  - Urine culture  - ciprofloxacin HCl (CIPRO) 500 MG tablet; Take 1 tablet (500 mg total) by mouth every 12 (twelve) hours. for 10 days  Dispense: 20 tablet; Refill: 0    2. Prostate cancer  --Discussed with Dr. Duque   --Will proceed with UC, PSA and prostate MRI    - Urine culture  - Prostate Specific Antigen, Diagnostic; Future  - MRI Prostate W W/O Contrast; Future  - ciprofloxacin HCl (CIPRO) 500 MG tablet; Take 1 tablet (500 mg total) by mouth every 12 (twelve) hours. for 10 days  Dispense: 20 tablet; Refill: 0     --fu after MRI    This note is dictated on M*Modal word recognition program.  There are word recognition mistakes that are occasionally missed on review.

## 2022-06-18 LAB — BACTERIA UR CULT: NO GROWTH
